# Patient Record
Sex: MALE | Race: WHITE | Employment: FULL TIME | URBAN - NONMETROPOLITAN AREA
[De-identification: names, ages, dates, MRNs, and addresses within clinical notes are randomized per-mention and may not be internally consistent; named-entity substitution may affect disease eponyms.]

---

## 2017-01-05 ENCOUNTER — OFFICE VISIT (OUTPATIENT)
Dept: FAMILY MEDICINE CLINIC | Age: 47
End: 2017-01-05

## 2017-01-05 VITALS
BODY MASS INDEX: 32.34 KG/M2 | DIASTOLIC BLOOD PRESSURE: 82 MMHG | SYSTOLIC BLOOD PRESSURE: 130 MMHG | TEMPERATURE: 97.8 F | HEIGHT: 74 IN | WEIGHT: 252 LBS

## 2017-01-05 DIAGNOSIS — J01.00 SUBACUTE MAXILLARY SINUSITIS: Primary | ICD-10-CM

## 2017-01-05 PROCEDURE — 99212 OFFICE O/P EST SF 10 MIN: CPT | Performed by: FAMILY MEDICINE

## 2017-01-05 RX ORDER — DOXYCYCLINE HYCLATE 100 MG/1
100 CAPSULE ORAL 2 TIMES DAILY
Qty: 20 CAPSULE | Refills: 0 | Status: SHIPPED | OUTPATIENT
Start: 2017-01-05 | End: 2017-01-15

## 2017-07-20 ENCOUNTER — OFFICE VISIT (OUTPATIENT)
Dept: FAMILY MEDICINE CLINIC | Age: 47
End: 2017-07-20
Payer: COMMERCIAL

## 2017-07-20 VITALS
DIASTOLIC BLOOD PRESSURE: 78 MMHG | BODY MASS INDEX: 31.83 KG/M2 | WEIGHT: 248 LBS | TEMPERATURE: 97.7 F | HEART RATE: 64 BPM | SYSTOLIC BLOOD PRESSURE: 120 MMHG | HEIGHT: 74 IN

## 2017-07-20 DIAGNOSIS — E66.9 OBESITY (BMI 30.0-34.9): ICD-10-CM

## 2017-07-20 DIAGNOSIS — K52.9 ACUTE GASTROENTERITIS: Primary | ICD-10-CM

## 2017-07-20 PROBLEM — E66.811 OBESITY (BMI 30.0-34.9): Status: ACTIVE | Noted: 2017-07-20

## 2017-07-20 PROCEDURE — 99212 OFFICE O/P EST SF 10 MIN: CPT | Performed by: FAMILY MEDICINE

## 2017-07-20 PROCEDURE — 4004F PT TOBACCO SCREEN RCVD TLK: CPT | Performed by: FAMILY MEDICINE

## 2017-07-20 PROCEDURE — G8427 DOCREV CUR MEDS BY ELIG CLIN: HCPCS | Performed by: FAMILY MEDICINE

## 2017-07-20 PROCEDURE — G8417 CALC BMI ABV UP PARAM F/U: HCPCS | Performed by: FAMILY MEDICINE

## 2017-07-20 ASSESSMENT — ENCOUNTER SYMPTOMS
VOMITING: 0
SHORTNESS OF BREATH: 0
DIARRHEA: 0
WHEEZING: 0

## 2019-02-23 ENCOUNTER — HOSPITAL ENCOUNTER (EMERGENCY)
Age: 49
Discharge: HOME | End: 2019-02-23
Payer: COMMERCIAL

## 2019-02-23 VITALS
TEMPERATURE: 99.5 F | HEART RATE: 84 BPM | BODY MASS INDEX: 32.08 KG/M2 | WEIGHT: 250 LBS | DIASTOLIC BLOOD PRESSURE: 79 MMHG | SYSTOLIC BLOOD PRESSURE: 133 MMHG | RESPIRATION RATE: 18 BRPM | OXYGEN SATURATION: 98 % | HEIGHT: 74 IN

## 2019-02-23 DIAGNOSIS — N50.812 TESTICULAR PAIN, LEFT: ICD-10-CM

## 2019-02-23 DIAGNOSIS — N30.01 ACUTE CYSTITIS WITH HEMATURIA: Primary | ICD-10-CM

## 2019-02-23 LAB
BILIRUBIN URINE: NEGATIVE
BLOOD, URINE: ABNORMAL
CHARACTER, URINE: ABNORMAL
COLOR: ABNORMAL
GLUCOSE, URINE: NEGATIVE MG/DL
KETONES, URINE: NEGATIVE
LEUKOCYTES, UA: ABNORMAL
NITRITE, URINE: NEGATIVE
PH UA: 6 (ref 5–9)
PROTEIN UA: 30 MG/DL
REFLEX TO URINE C & S: ABNORMAL
SPECIFIC GRAVITY UA: 1.02 (ref 1–1.03)
UROBILINOGEN, URINE: 1 EU/DL (ref 0–1)

## 2019-02-23 PROCEDURE — 87491 CHLMYD TRACH DNA AMP PROBE: CPT

## 2019-02-23 PROCEDURE — 87184 SC STD DISK METHOD PER PLATE: CPT

## 2019-02-23 PROCEDURE — 87591 N.GONORRHOEAE DNA AMP PROB: CPT

## 2019-02-23 PROCEDURE — 6360000002 HC RX W HCPCS: Performed by: NURSE PRACTITIONER

## 2019-02-23 PROCEDURE — 87077 CULTURE AEROBIC IDENTIFY: CPT

## 2019-02-23 PROCEDURE — 99213 OFFICE O/P EST LOW 20 MIN: CPT | Performed by: NURSE PRACTITIONER

## 2019-02-23 PROCEDURE — 81003 URINALYSIS AUTO W/O SCOPE: CPT

## 2019-02-23 PROCEDURE — 87186 SC STD MICRODIL/AGAR DIL: CPT

## 2019-02-23 PROCEDURE — 6370000000 HC RX 637 (ALT 250 FOR IP): Performed by: NURSE PRACTITIONER

## 2019-02-23 PROCEDURE — 2500000003 HC RX 250 WO HCPCS: Performed by: NURSE PRACTITIONER

## 2019-02-23 PROCEDURE — 99214 OFFICE O/P EST MOD 30 MIN: CPT

## 2019-02-23 PROCEDURE — 96372 THER/PROPH/DIAG INJ SC/IM: CPT

## 2019-02-23 PROCEDURE — 87086 URINE CULTURE/COLONY COUNT: CPT

## 2019-02-23 RX ORDER — AMOXICILLIN 500 MG/1
500 CAPSULE ORAL 3 TIMES DAILY
COMMUNITY
End: 2020-02-10 | Stop reason: ALTCHOICE

## 2019-02-23 RX ORDER — AZITHROMYCIN 250 MG/1
1000 TABLET, FILM COATED ORAL ONCE
Status: COMPLETED | OUTPATIENT
Start: 2019-02-23 | End: 2019-02-23

## 2019-02-23 RX ORDER — DOXYCYCLINE HYCLATE 100 MG
100 TABLET ORAL 2 TIMES DAILY
Qty: 20 TABLET | Refills: 0 | Status: SHIPPED | OUTPATIENT
Start: 2019-02-23 | End: 2019-03-05

## 2019-02-23 RX ADMIN — LIDOCAINE HYDROCHLORIDE 1 G: 10 INJECTION, SOLUTION EPIDURAL; INFILTRATION; INTRACAUDAL; PERINEURAL at 14:56

## 2019-02-23 RX ADMIN — AZITHROMYCIN 1000 MG: 250 TABLET, FILM COATED ORAL at 14:55

## 2019-02-23 ASSESSMENT — ENCOUNTER SYMPTOMS
BACK PAIN: 1
COUGH: 1
SORE THROAT: 1
NAUSEA: 0
VOMITING: 0
ABDOMINAL PAIN: 1
DIARRHEA: 0
SHORTNESS OF BREATH: 0

## 2019-02-23 ASSESSMENT — PAIN DESCRIPTION - FREQUENCY: FREQUENCY: CONTINUOUS

## 2019-02-23 ASSESSMENT — PAIN DESCRIPTION - LOCATION: LOCATION: ABDOMEN

## 2019-02-23 ASSESSMENT — PAIN DESCRIPTION - DESCRIPTORS: DESCRIPTORS: CONSTANT

## 2019-02-23 ASSESSMENT — PAIN DESCRIPTION - ORIENTATION: ORIENTATION: LEFT

## 2019-02-23 ASSESSMENT — PAIN SCALES - GENERAL: PAINLEVEL_OUTOF10: 5

## 2019-02-23 ASSESSMENT — PAIN DESCRIPTION - PAIN TYPE: TYPE: ACUTE PAIN

## 2019-02-24 LAB
CHLAMYDIA TRACHOMATIS BY RT-PCR: NOT DETECTED
CT/NG SOURCE: NORMAL
NEISSERIA GONORRHOEAE BY RT-PCR: NOT DETECTED

## 2019-02-26 LAB
ORGANISM: ABNORMAL
URINE CULTURE REFLEX: ABNORMAL
URINE CULTURE REFLEX: ABNORMAL

## 2019-03-06 ENCOUNTER — OFFICE VISIT (OUTPATIENT)
Dept: FAMILY MEDICINE CLINIC | Age: 49
End: 2019-03-06
Payer: COMMERCIAL

## 2019-03-06 VITALS
SYSTOLIC BLOOD PRESSURE: 136 MMHG | BODY MASS INDEX: 31.83 KG/M2 | WEIGHT: 256 LBS | HEART RATE: 72 BPM | HEIGHT: 75 IN | DIASTOLIC BLOOD PRESSURE: 82 MMHG

## 2019-03-06 DIAGNOSIS — N41.0 PROSTATITIS, ACUTE: Primary | ICD-10-CM

## 2019-03-06 PROCEDURE — G8417 CALC BMI ABV UP PARAM F/U: HCPCS | Performed by: FAMILY MEDICINE

## 2019-03-06 PROCEDURE — G8484 FLU IMMUNIZE NO ADMIN: HCPCS | Performed by: FAMILY MEDICINE

## 2019-03-06 PROCEDURE — 99213 OFFICE O/P EST LOW 20 MIN: CPT | Performed by: FAMILY MEDICINE

## 2019-03-06 PROCEDURE — 3017F COLORECTAL CA SCREEN DOC REV: CPT | Performed by: FAMILY MEDICINE

## 2019-03-06 PROCEDURE — G8427 DOCREV CUR MEDS BY ELIG CLIN: HCPCS | Performed by: FAMILY MEDICINE

## 2019-03-06 PROCEDURE — 4004F PT TOBACCO SCREEN RCVD TLK: CPT | Performed by: FAMILY MEDICINE

## 2019-03-06 RX ORDER — CIPROFLOXACIN 500 MG/1
500 TABLET, FILM COATED ORAL 2 TIMES DAILY
Qty: 28 TABLET | Refills: 0 | Status: SHIPPED | OUTPATIENT
Start: 2019-03-06 | End: 2019-03-25 | Stop reason: SDUPTHER

## 2019-03-06 ASSESSMENT — PATIENT HEALTH QUESTIONNAIRE - PHQ9
SUM OF ALL RESPONSES TO PHQ QUESTIONS 1-9: 0
1. LITTLE INTEREST OR PLEASURE IN DOING THINGS: 0
SUM OF ALL RESPONSES TO PHQ QUESTIONS 1-9: 0
SUM OF ALL RESPONSES TO PHQ9 QUESTIONS 1 & 2: 0
2. FEELING DOWN, DEPRESSED OR HOPELESS: 0

## 2019-03-25 ENCOUNTER — TELEPHONE (OUTPATIENT)
Dept: FAMILY MEDICINE CLINIC | Age: 49
End: 2019-03-25

## 2019-03-25 RX ORDER — CIPROFLOXACIN 500 MG/1
500 TABLET, FILM COATED ORAL 2 TIMES DAILY
Qty: 28 TABLET | Refills: 0 | Status: SHIPPED | OUTPATIENT
Start: 2019-03-25 | End: 2020-05-04 | Stop reason: SDUPTHER

## 2020-02-10 ENCOUNTER — TELEPHONE (OUTPATIENT)
Dept: FAMILY MEDICINE CLINIC | Age: 50
End: 2020-02-10

## 2020-02-10 ENCOUNTER — OFFICE VISIT (OUTPATIENT)
Dept: FAMILY MEDICINE CLINIC | Age: 50
End: 2020-02-10
Payer: COMMERCIAL

## 2020-02-10 VITALS
DIASTOLIC BLOOD PRESSURE: 78 MMHG | WEIGHT: 252 LBS | TEMPERATURE: 97.9 F | HEART RATE: 70 BPM | BODY MASS INDEX: 31.33 KG/M2 | OXYGEN SATURATION: 98 % | SYSTOLIC BLOOD PRESSURE: 126 MMHG | HEIGHT: 75 IN

## 2020-02-10 PROCEDURE — 99213 OFFICE O/P EST LOW 20 MIN: CPT | Performed by: FAMILY MEDICINE

## 2020-02-10 RX ORDER — PREDNISONE 20 MG/1
20 TABLET ORAL 2 TIMES DAILY
Qty: 10 TABLET | Refills: 0 | Status: SHIPPED | OUTPATIENT
Start: 2020-02-10 | End: 2020-02-15

## 2020-02-10 RX ORDER — DOXYCYCLINE HYCLATE 100 MG
100 TABLET ORAL 2 TIMES DAILY
Qty: 20 TABLET | Refills: 0 | Status: SHIPPED | OUTPATIENT
Start: 2020-02-10 | End: 2020-05-04

## 2020-02-10 ASSESSMENT — ENCOUNTER SYMPTOMS
SHORTNESS OF BREATH: 0
SORE THROAT: 0
RHINORRHEA: 1
COUGH: 1
WHEEZING: 1

## 2020-02-10 ASSESSMENT — PATIENT HEALTH QUESTIONNAIRE - PHQ9
SUM OF ALL RESPONSES TO PHQ9 QUESTIONS 1 & 2: 0
SUM OF ALL RESPONSES TO PHQ QUESTIONS 1-9: 0
SUM OF ALL RESPONSES TO PHQ QUESTIONS 1-9: 0
1. LITTLE INTEREST OR PLEASURE IN DOING THINGS: 0
2. FEELING DOWN, DEPRESSED OR HOPELESS: 0

## 2020-02-10 NOTE — PROGRESS NOTES
[facility]  [facility] MEDICINE  [address] [facility]  Dept: [phone]  Dept Fax: [phone]  Loc: [phone]  PROGRESS NOTE      Visit Date: 2/10/2020    Shirley Mendez is a 45 y.o. male who presents today for:  Chief Complaint   Patient presents with    Chest Congestion     all started 4 days ago       Cough    Congestion    Fever    Nasal Congestion    Head Congestion       Subjective:  HPI     Chest congestion, cough, head congestion. Started 3 days ago. Had a fever a few days ago. He took mucinex. He missed work this past weekend. Still smoking. He did have body aches for a few days which are better. Fevers are better. He is eating and drinking. 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 HENT: Positive for rhinorrhea. Negative for ear pain and sore throat. Respiratory: Positive for cough and wheezing. Negative for shortness of breath. Neurological: Positive for dizziness. Patient Active Problem List   Diagnosis    Obesity (BMI 30.0-34.9)     No past medical history on file. Past Surgical History:   Procedure Laterality Date    APPENDECTOMY      UPPER GASTROINTESTINAL ENDOSCOPY       No family history on file. Social History     Tobacco Use    Smoking status: Current Every Day Smoker     Packs/day: 2.00     Years: 30.00     Pack years: 60.00     Types: Cigarettes    Smokeless tobacco: Never Used   Substance Use Topics    Alcohol use: No      No current outpatient medications on file. No current facility-administered medications for this visit.       No Known Allergies  Health Maintenance   Topic Date Due    Pneumococcal 0-64 years Vaccine (1 of 1 - PPSV23) 01/10/1975    DTaP/Tdap/Td vaccine (1 - Tdap) 01/10/1980    HIV screen  01/10/1984    Lipid screen  01/10/2009    Diabetes screen  01/10/2009    Shingles Vaccine (1 of 2) 01/10/2019    Colon cancer screen colonoscopy  01/10/2019    Flu vaccine (1) 09/01/2019    Hepatitis A vaccine  Aged Out    Hepatitis B vaccine  Aged Out    Hib vaccine  Aged Out    Meningococcal (ACWY) vaccine  Aged Out       Objective:  /78 (Site: Left Upper Arm, Position: Sitting, Cuff Size: Large Adult)   Pulse 70   Temp 97.9 °F (36.6 °C)   Ht 6' 3\" (1.905 m)   Wt 252 lb (114.3 kg)   SpO2 98%   BMI 31.50 kg/m²   Physical Exam  Vitals signs reviewed. HENT:      Right Ear: Tympanic membrane and ear canal normal.      Left Ear: Tympanic membrane and ear canal normal.      Nose: Mucosal edema and rhinorrhea present. Mouth/Throat:      Mouth: Mucous membranes are moist.      Pharynx: No oropharyngeal exudate or posterior oropharyngeal erythema. Cardiovascular:      Rate and Rhythm: Normal rate and regular rhythm. Heart sounds: No murmur. Pulmonary:      Effort: Pulmonary effort is normal. No respiratory distress. Breath sounds: Normal breath sounds. No wheezing or rhonchi. Neurological:      Mental Status: He is alert and oriented to person, place, and time. Mental status is at baseline. Psychiatric:         Mood and Affect: Mood normal.         Behavior: Behavior normal.         Impression/Plan:  1. Acute bronchitis due to other specified organisms  New problem. Discussed that I think he had influenza-like symptoms and given he is outside the treatment window we will not test.  Given his long history of smoking he likely has undiagnosed COPD so we will treat him for an exacerbation with prednisone and doxycycline.  -continue over-the-counter meds  - predniSONE (DELTASONE) 20 MG tablet; Take 1 tablet by mouth 2 times daily for 5 days  Dispense: 10 tablet; Refill: 0  - doxycycline hyclate (VIBRA-TABS) 100 MG tablet; Take 1 tablet by mouth 2 times daily  Dispense: 20 tablet; Refill: 0    2. Smoker  Discussed cessation    No work 2/7 through today.   He may return to work this Friday as he

## 2020-05-04 ENCOUNTER — TELEPHONE (OUTPATIENT)
Dept: FAMILY MEDICINE CLINIC | Age: 50
End: 2020-05-04

## 2020-05-04 ENCOUNTER — TELEMEDICINE (OUTPATIENT)
Dept: FAMILY MEDICINE CLINIC | Age: 50
End: 2020-05-04
Payer: COMMERCIAL

## 2020-05-04 PROCEDURE — 99213 OFFICE O/P EST LOW 20 MIN: CPT | Performed by: FAMILY MEDICINE

## 2020-05-04 RX ORDER — CIPROFLOXACIN 500 MG/1
500 TABLET, FILM COATED ORAL 2 TIMES DAILY
Qty: 28 TABLET | Refills: 0 | Status: SHIPPED | OUTPATIENT
Start: 2020-05-04 | End: 2020-05-18

## 2020-05-04 ASSESSMENT — ENCOUNTER SYMPTOMS
CONSTIPATION: 1
DIARRHEA: 0

## 2020-05-04 NOTE — LETTER
[address] Medicine  [address] [facility]  Phone: [phone]  Fax: [phone]    Dariela Vasquez MD        May 4, 2020     Patient: Tarun Jorgensen   YOB: 1970   Date of Visit: 5/4/2020       To Whom It May Concern: It is my medical opinion that Michael Tomlin should not work from 5/1-5/4.  he may return to work on 5/8. If you have any questions or concerns, please don't hesitate to call.     Sincerely,          Dariela Vasquez MD

## 2020-05-04 NOTE — PROGRESS NOTES
2020    TELEHEALTH EVALUATION -- Audio/Visual (During CPGKY-80 public health emergency)    HPI:    Sheila Victoria (:  1970) has requested an audio/video evaluation for the following concern(s):    Dysuria, frequency,   Having left flank pain. Having \"low grade temps. \"  Started 3 days ago. Symptoms are improving. No urinary dribbling. He missed work starting -. Patient location: His home  Physician location: [facility]    Review of Systems   Constitutional: Positive for chills and fever. Gastrointestinal: Positive for constipation. Negative for diarrhea. Genitourinary: Positive for dysuria, flank pain and frequency. Negative for scrotal swelling. Prior to Visit Medications    Not on File       Social History     Tobacco Use    Smoking status: Current Every Day Smoker     Packs/day: 2.00     Years: 30.00     Pack years: 60.00     Types: Cigarettes    Smokeless tobacco: Never Used   Substance Use Topics    Alcohol use: No    Drug use: No        No Known Allergies, No past medical history on file. PHYSICAL EXAMINATION:  [ INSTRUCTIONS:  \"[x]\" Indicates a positive item  \"[]\" Indicates a negative item  -- DELETE ALL ITEMS NOT EXAMINED]    Constitutional: [x] Appears well-developed and well-nourished [x] No apparent distress      [] Abnormal-   Mental status  [x] Alert and awake  [x] Oriented to person/place/time [x]Able to follow commands      Eyes:  Sclera  [x]  Normal  [] Abnormal -         Discharge [x]  None visible  [] Abnormal -    HENT:   [x] Normocephalic, atraumatic. [] Abnormal   [] Mouth/Throat: Mucous membranes are moist.       Neck: [x] No visualized mass     Pulmonary/Chest: [x] Respiratory effort normal.  [x] No visualized signs of difficulty breathing or respiratory distress        [] Abnormal-        Abd:  No tenderness on patient self-directed palpation.            Skin:        [x] No significant exanthematous lesions or discoloration noted

## 2020-05-04 NOTE — TELEPHONE ENCOUNTER
Patient has a bladder infection. Kidney pain left side and back pain. Symptoms started Thursday. Patient is requesting something called in. Patient is requesting a work slip he called off work starting 05/01/20.   Pharmacy [facility]

## 2020-05-14 ENCOUNTER — TELEPHONE (OUTPATIENT)
Dept: FAMILY MEDICINE CLINIC | Age: 50
End: 2020-05-14

## 2021-01-30 ENCOUNTER — HOSPITAL ENCOUNTER (EMERGENCY)
Age: 51
Discharge: HOME | End: 2021-01-30
Payer: COMMERCIAL

## 2021-01-30 VITALS
SYSTOLIC BLOOD PRESSURE: 149 MMHG | WEIGHT: 260 LBS | RESPIRATION RATE: 18 BRPM | OXYGEN SATURATION: 95 % | BODY MASS INDEX: 33.37 KG/M2 | HEART RATE: 89 BPM | DIASTOLIC BLOOD PRESSURE: 108 MMHG | HEIGHT: 74 IN | TEMPERATURE: 98.2 F

## 2021-01-30 DIAGNOSIS — B34.9 VIRAL ILLNESS: Primary | ICD-10-CM

## 2021-01-30 DIAGNOSIS — Z20.822 EXPOSURE TO COVID-19 VIRUS: ICD-10-CM

## 2021-01-30 LAB
FLU A ANTIGEN: NEGATIVE
FLU B ANTIGEN: NEGATIVE

## 2021-01-30 PROCEDURE — U0003 INFECTIOUS AGENT DETECTION BY NUCLEIC ACID (DNA OR RNA); SEVERE ACUTE RESPIRATORY SYNDROME CORONAVIRUS 2 (SARS-COV-2) (CORONAVIRUS DISEASE [COVID-19]), AMPLIFIED PROBE TECHNIQUE, MAKING USE OF HIGH THROUGHPUT TECHNOLOGIES AS DESCRIBED BY CMS-2020-01-R: HCPCS

## 2021-01-30 PROCEDURE — 99213 OFFICE O/P EST LOW 20 MIN: CPT

## 2021-01-30 PROCEDURE — 87804 INFLUENZA ASSAY W/OPTIC: CPT

## 2021-01-30 PROCEDURE — 99213 OFFICE O/P EST LOW 20 MIN: CPT | Performed by: NURSE PRACTITIONER

## 2021-01-30 ASSESSMENT — ENCOUNTER SYMPTOMS
VOMITING: 0
NAUSEA: 0
COUGH: 1
SORE THROAT: 1
DIARRHEA: 0
SINUS PRESSURE: 0
SHORTNESS OF BREATH: 0

## 2021-01-30 ASSESSMENT — PAIN DESCRIPTION - PROGRESSION: CLINICAL_PROGRESSION: RAPIDLY WORSENING

## 2021-01-30 ASSESSMENT — PAIN DESCRIPTION - DESCRIPTORS: DESCRIPTORS: ACHING

## 2021-01-30 ASSESSMENT — PAIN DESCRIPTION - FREQUENCY: FREQUENCY: CONTINUOUS

## 2021-01-30 ASSESSMENT — PAIN - FUNCTIONAL ASSESSMENT: PAIN_FUNCTIONAL_ASSESSMENT: PREVENTS OR INTERFERES SOME ACTIVE ACTIVITIES AND ADLS

## 2021-01-30 NOTE — ED PROVIDER NOTES
the hospital encounter of 01/30/21   Rapid influenza A/B antigens   Result Value Ref Range    Flu A Antigen Negative NEGATIVE    Flu B Antigen Negative NEGATIVE       IMAGING:    No orders to display         EKG:      URGENT CARE COURSE:     Vitals:    01/30/21 1455   BP: (!) 149/108   Pulse: 89   Resp: 18   Temp: 98.2 °F (36.8 °C)   TempSrc: Temporal   SpO2: 95%   Weight: 260 lb (117.9 kg)   Height: 6' 2\" (1.88 m)       Medications - No data to display         PROCEDURES:  None    FINAL IMPRESSION      1. Viral illness    2. Exposure to COVID-19 virus          DISPOSITION/ PLAN     Patient presents with an acute viral illness after exposure to COVID-19. Influenza A/B was negative. Patient will be tested for COVID-19. Can use OTC meds for symptom management. Can start Zinc 30 mg daily, Vitamin C 1000 mg twice daily and Vitamin D3 5000 IU daily to help boost immune system. Patient will need to self quarantine until test results have returned, are negative and symptom-free. Patient was instructed to practice good hand hygiene as well as social distancing and to wear a mask when around any other family members. Patient will be notified of test results as soon as they are available and if positive will be referred to his PCP or the health department for further instruction. Work/school note provided. Further instructions were outlined verbally and in the patient's discharge instructions. All the patient's questions were answered. The patient/parent agreed with the plan and was discharged from the [facility] in good condition. PATIENT REFERRED TO:  Radha Mendez MD  [address] [facility] / [facility] [address]      DISCHARGE MEDICATIONS:  There are no discharge medications for this patient. There are no discharge medications for this patient. There are no discharge medications for this patient.       Bruce Lopez, APRN - CNP    (Please note that portions of this note were completed with a voice recognition program. Efforts were made to edit the dictations but occasionally words are mis-transcribed.)         Vicente Alexa Case, APRN - CNP  01/30/21 [address] [facility], APRN - CNP  01/30/21 8140

## 2021-01-30 NOTE — ED NOTES
Pt discharge teaching taught via teach back method. Talked with pt about medication and follow up care. No other concerns voiced. Pt ambulated to leave, rr easy and unlabored.      Chelsie Carter RN  01/30/21 6856

## 2021-02-01 ENCOUNTER — CARE COORDINATION (OUTPATIENT)
Dept: CARE COORDINATION | Age: 51
End: 2021-02-01

## 2021-02-01 NOTE — ACP (ADVANCE CARE PLANNING)
Advance Care Planning   Healthcare Decision Maker:      Click here to complete Healthcare Decision Makers including selection of the Healthcare Decision Maker Relationship (ie \"Primary\"). Today we documented Decision Maker(s) consistent with Legal Next of Kin hierarchy.

## 2021-02-01 NOTE — CARE COORDINATION
Patient contacted regarding recent visit for viral symptoms. This Ronda Almazan contacted the patient by telephone to perform post discharge call. Verified name and  with patient as identifiers. Provided introduction to self, and reason for call due to viral symptoms of infection and/or exposure to COVID-19. Call within 2 business days of discharge: Yes       Patient presented to emergency department/flu clinic with complaints of viral symptoms/exposure to COVID. Patient reports symptoms are the same. Due to no new or worsening symptoms the RN CTN/ACZULLY was not notified for escalation. Discussed exposure protocols and quarantine with CDC Guidelines What To Do If You Are Sick    Patient was given an opportunity for questions and concerns. Stay home except to get medical care    Separate yourself from other people and animals in your home    Call ahead before visiting your doctor    Wear a facemask    Cover your coughs and sneezes    Clean your hands often    Avoid sharing personal household items    Clean all high-touch surfaces everyday    Monitor your symptoms  Seek prompt medical attention if your illness is worsening (e.g., difficulty breathing). Before seeking care, call your healthcare provider and tell them that you have, or are being evaluated for, COVID-19. Put on a facemask before you enter the facility. These steps will help the healthcare provider's office to keep other people in the office or waiting room from getting infected or exposed. Ask your healthcare provider to call the local or [facility] health department. Persons who are placed under If you have a medical emergency and need to call 911, notify the dispatch personnel that you have, or are being evaluated for COVID-19. If possible, put on a facemask before emergency medical services arrive.     The patient agrees to contact the [employer] exposure line [phone], local health department [place] Department of Health: ([phone]) and PCP office for questions related to their healthcare. Author provided contact information for future reference. Patient/family/caregiver given information for [facility] and agrees to enroll yes  Patient's preferred e-mail: [email or web address]   Patient's preferred phone number: [phone]  Based on Loop alert triggers, patient will be contacted by nurse care manager for worsening symptoms. Patient was seen in the ER and tested for Covid. Patient is self quarantined and was given the covid hotline number. Patient educated on covid precautions and given agred to sign up for get well loop.     [facility]  [address] [facility]   Medication Assistance  [address] [facility] and [email or web address]    [phone]  [phone]

## 2021-02-03 ENCOUNTER — VIRTUAL VISIT (OUTPATIENT)
Dept: FAMILY MEDICINE CLINIC | Age: 51
End: 2021-02-03
Payer: COMMERCIAL

## 2021-02-03 DIAGNOSIS — R39.9 UTI SYMPTOMS: ICD-10-CM

## 2021-02-03 DIAGNOSIS — J01.90 ACUTE BACTERIAL SINUSITIS: Primary | ICD-10-CM

## 2021-02-03 DIAGNOSIS — B96.89 ACUTE BACTERIAL SINUSITIS: Primary | ICD-10-CM

## 2021-02-03 PROCEDURE — 99213 OFFICE O/P EST LOW 20 MIN: CPT | Performed by: FAMILY MEDICINE

## 2021-02-03 RX ORDER — DOXYCYCLINE HYCLATE 100 MG
100 TABLET ORAL 2 TIMES DAILY
Qty: 20 TABLET | Refills: 0 | Status: SHIPPED | OUTPATIENT
Start: 2021-02-03 | End: 2021-02-13

## 2021-02-03 ASSESSMENT — ENCOUNTER SYMPTOMS
SORE THROAT: 1
SINUS PRESSURE: 1
COUGH: 1
SHORTNESS OF BREATH: 0
WHEEZING: 0

## 2021-02-03 NOTE — PROGRESS NOTES
2/3/2021    TELEHEALTH EVALUATION -- Audio/Visual (During AROTA-10 public health emergency)    HPI:    Alejandro Bautista (:  1970) has requested an audio/video evaluation for the following concern(s):    Fever:  Improving fevers. in urgent care on . covid test was negative. Influenza test was negative. He does smoke. Has chronic cough. No loss of taste or smell. He was exposed to coworker who tested positive for covid. Heavy feeling in groin started yesterday. He took 2 cipro yesterday that he had left over. Has dark colored urine with odor. Has difficulty starting urine stream.     Works at [employer] Review of Systems   Constitutional: Positive for fatigue and fever. Negative for chills. HENT: Positive for postnasal drip, sinus pressure and sore throat. Respiratory: Positive for cough. Negative for shortness of breath and wheezing. Genitourinary: Positive for difficulty urinating. Negative for dysuria, frequency and urgency. Prior to Visit Medications    Not on File       Social History     Tobacco Use    Smoking status: Current Every Day Smoker     Packs/day: 2.00     Years: 36.00     Pack years: 72.00     Types: Cigarettes    Smokeless tobacco: Never Used   Substance Use Topics    Alcohol use: No    Drug use: No        No Known Allergies, History reviewed. No pertinent past medical history. PHYSICAL EXAMINATION:  [ INSTRUCTIONS:  \"[x]\" Indicates a positive item  \"[]\" Indicates a negative item  -- DELETE ALL ITEMS NOT EXAMINED]  Constitutional: [x] Appears well-developed and well-nourished [x] No apparent distress      [] Abnormal-   Mental status  [x] Alert and awake  [] Oriented to person/place/time [x]Able to follow commands      Eyes:  EOM    []  Normal  [] Abnormal-  Sclera  [x]  Normal  [] Abnormal -         Discharge [x]  None visible  [] Abnormal -    HENT:   [x] Normocephalic, atraumatic.   [] Abnormal   [x] Mouth/Throat: Mucous membranes are moist. Tiffanie Sheriff is a 45 y.o. male being evaluated by a Virtual Visit (video visit) encounter to address concerns as mentioned above. A caregiver was present when appropriate. Due to this being a TeleHealth encounter (During RYVLK-60 public health emergency), evaluation of the following organ systems was limited: Vitals/Constitutional/EENT/Resp/CV/GI//MS/Neuro/Skin/Heme-Lymph-Imm. Pursuant to the emergency declaration under the [address] [place] [address] and the [employer] Act, this Virtual Visit was conducted with patient's (and/or legal guardian's) consent, to reduce the patient's risk of exposure to COVID-19 and provide necessary medical care. The patient (and/or legal guardian) has also been advised to contact this office for worsening conditions or problems, and seek emergency medical treatment and/or call 911 if deemed necessary. Patient identification was verified at the start of the visit: Yes    Total time spent on this encounter: Not billed by time    Services were provided through a video synchronous discussion virtually to substitute for in-person clinic visit. Patient and provider were located at their individual homes. --Kev Hendricks MD on 2/3/2021 at 8:51 AM    An electronic signature was used to authenticate this note.

## 2021-02-17 LAB
SARS-COV-2: NOT DETECTED
SOURCE: NORMAL

## 2021-08-20 ENCOUNTER — TELEPHONE (OUTPATIENT)
Dept: FAMILY MEDICINE CLINIC | Age: 51
End: 2021-08-20

## 2021-08-20 ENCOUNTER — E-VISIT (OUTPATIENT)
Dept: FAMILY MEDICINE CLINIC | Age: 51
End: 2021-08-20
Payer: COMMERCIAL

## 2021-08-20 DIAGNOSIS — J06.9 VIRAL URI: Primary | ICD-10-CM

## 2021-08-20 PROCEDURE — 98970 NQHP OL DIG ASSMT&MGMT 5-10: CPT | Performed by: NURSE PRACTITIONER

## 2021-08-20 RX ORDER — AMOXICILLIN 875 MG/1
875 TABLET, COATED ORAL 2 TIMES DAILY
Qty: 20 TABLET | Refills: 0 | Status: SHIPPED | OUTPATIENT
Start: 2021-08-20 | End: 2021-08-30

## 2021-08-20 RX ORDER — PREDNISONE 10 MG/1
10 TABLET ORAL 2 TIMES DAILY
Qty: 10 TABLET | Refills: 0 | Status: SHIPPED | OUTPATIENT
Start: 2021-08-20 | End: 2021-08-25

## 2021-08-20 ASSESSMENT — LIFESTYLE VARIABLES
SMOKING_YEARS: 30
SMOKING_STATUS: YES

## 2021-08-27 ENCOUNTER — TELEPHONE (OUTPATIENT)
Dept: FAMILY MEDICINE CLINIC | Age: 51
End: 2021-08-27

## 2021-08-27 NOTE — TELEPHONE ENCOUNTER
Candida Em calls with C/O Sinus congestion ,headache, fatigued , fever last Monday. Waiting on COVID results from [employer] 2-7 days. He missed work today and probably tomorrow  , his work will not excuse unless positive for COVID He was treated for Sinus infection on 08/ 20/21 with steroids and now is on [address]. He just feels like crap. Could he have a work slip. Advised him he should quarantine until results are back , stay hydrated, Tylenol/ Motrin

## 2021-08-31 ENCOUNTER — HOSPITAL ENCOUNTER (OUTPATIENT)
Age: 51
Discharge: HOME | End: 2021-08-31
Payer: COMMERCIAL

## 2021-08-31 ENCOUNTER — E-VISIT (OUTPATIENT)
Dept: FAMILY MEDICINE CLINIC | Age: 51
End: 2021-08-31
Payer: COMMERCIAL

## 2021-08-31 DIAGNOSIS — J01.90 ACUTE BACTERIAL SINUSITIS: Primary | ICD-10-CM

## 2021-08-31 DIAGNOSIS — J01.90 ACUTE BACTERIAL SINUSITIS: ICD-10-CM

## 2021-08-31 DIAGNOSIS — B96.89 ACUTE BACTERIAL SINUSITIS: ICD-10-CM

## 2021-08-31 DIAGNOSIS — B96.89 ACUTE BACTERIAL SINUSITIS: Primary | ICD-10-CM

## 2021-08-31 LAB
INFLUENZA A: NOT DETECTED
INFLUENZA B: NOT DETECTED
SARS-COV-2 RNA, RT PCR: NOT DETECTED

## 2021-08-31 PROCEDURE — 99421 OL DIG E/M SVC 5-10 MIN: CPT | Performed by: FAMILY MEDICINE

## 2021-08-31 PROCEDURE — 87636 SARSCOV2 & INF A&B AMP PRB: CPT

## 2021-08-31 RX ORDER — DOXYCYCLINE HYCLATE 100 MG
100 TABLET ORAL 2 TIMES DAILY
Qty: 20 TABLET | Refills: 0 | Status: SHIPPED | OUTPATIENT
Start: 2021-08-31 | End: 2021-09-10

## 2021-08-31 ASSESSMENT — LIFESTYLE VARIABLES
SMOKING_STATUS: YES
SMOKING_YEARS: 30

## 2021-08-31 NOTE — PROGRESS NOTES
HPI: as per patient provided history  Exam: N/A (electronic visit)  ASSESSMENT/PLAN:  1. Acute bacterial sinusitis  Symptoms for over a week. Change amoxicillin to doxycycline to treat a sinus infection. We will do a repeat Covid test.  He can let us know later this week if he needs a note for work depending on how he is feeling  - doxycycline hyclate (VIBRA-TABS) 100 MG tablet; Take 1 tablet by mouth 2 times daily for 10 days  Dispense: 20 tablet; Refill: 0  - COVID-19; Future      Patient instructed to call the office if worsens, or fails to improve as anticipated. 5-10 minutes were spent on the digital evaluation and management of this patient.

## 2021-09-09 ENCOUNTER — APPOINTMENT (OUTPATIENT)
Dept: GENERAL RADIOLOGY | Age: 51
End: 2021-09-09
Payer: COMMERCIAL

## 2021-09-09 ENCOUNTER — HOSPITAL ENCOUNTER (EMERGENCY)
Age: 51
Discharge: HOME | End: 2021-09-09
Payer: COMMERCIAL

## 2021-09-09 VITALS
TEMPERATURE: 98.6 F | DIASTOLIC BLOOD PRESSURE: 82 MMHG | SYSTOLIC BLOOD PRESSURE: 162 MMHG | OXYGEN SATURATION: 96 % | RESPIRATION RATE: 18 BRPM | HEART RATE: 70 BPM | BODY MASS INDEX: 33.38 KG/M2 | WEIGHT: 260 LBS

## 2021-09-09 DIAGNOSIS — B34.9 VIRAL ILLNESS: Primary | ICD-10-CM

## 2021-09-09 DIAGNOSIS — R06.89 DECREASED LUNG SOUNDS: ICD-10-CM

## 2021-09-09 DIAGNOSIS — J44.1 COPD EXACERBATION (HCC): ICD-10-CM

## 2021-09-09 LAB — SARS-COV-2, NAA: NOT  DETECTED

## 2021-09-09 PROCEDURE — 87635 SARS-COV-2 COVID-19 AMP PRB: CPT

## 2021-09-09 PROCEDURE — 93005 ELECTROCARDIOGRAM TRACING: CPT | Performed by: NURSE PRACTITIONER

## 2021-09-09 PROCEDURE — 71046 X-RAY EXAM CHEST 2 VIEWS: CPT

## 2021-09-09 PROCEDURE — 99214 OFFICE O/P EST MOD 30 MIN: CPT | Performed by: NURSE PRACTITIONER

## 2021-09-09 PROCEDURE — 99214 OFFICE O/P EST MOD 30 MIN: CPT

## 2021-09-09 ASSESSMENT — ENCOUNTER SYMPTOMS
NAUSEA: 0
ABDOMINAL PAIN: 0
CHEST TIGHTNESS: 1
SORE THROAT: 1
SHORTNESS OF BREATH: 1
VOMITING: 0
COUGH: 1
WHEEZING: 0

## 2021-09-09 NOTE — ED TRIAGE NOTES
Patient to room with c/o head congestion and sinus pressure beginning three weeks ago. Increased cough beginning one week ago. C/o shortness of breath and chest congestion upon waking this morning. Denies chest pain. Continues on previously prescribed antibiotics.

## 2021-09-09 NOTE — ED PROVIDER NOTES
[address]  Urgent Care Encounter       CHIEF COMPLAINT       Chief Complaint   Patient presents with    Cough     head congestion       Nurses Notes reviewed and I agree except as noted in the HPI. HISTORY OF PRESENT ILLNESS   Gopi Rand is a 45 y.o. male who presents with complaints of a cough, head congestion, chest tightness, shortness of breath, and feelings of \"wooziness\". He states his symptoms have been persistent for the past 3 weeks. He previously seen his PCP and has completed 2 courses of antibiotics without any relief. He believes his symptoms are slightly better than what they were a few weeks ago. However, he does not like the feelings of \"wooziness\". He denies any dizziness or syncopal episodes. He is unsure if anything makes it worse. He previously has tested negative for COVID-19 twice in the past 1 to 2 weeks. He does admit to being a heavy smoker. The history is provided by the patient and the spouse. REVIEW OF SYSTEMS     Review of Systems   Constitutional: Negative for chills and fever. HENT: Positive for congestion and sore throat. Respiratory: Positive for cough, chest tightness and shortness of breath. Negative for wheezing. Cardiovascular: Negative for chest pain and palpitations. Gastrointestinal: Negative for abdominal pain, nausea and vomiting. Neurological: Positive for light-headedness (wooziness). Negative for weakness and headaches. PAST MEDICAL HISTORY   History reviewed. No pertinent past medical history. SURGICALHISTORY     Patient  has a past surgical history that includes Appendectomy and Upper gastrointestinal endoscopy.     CURRENT MEDICATIONS       Previous Medications    ALBUTEROL SULFATE  (90 BASE) MCG/ACT INHALER    Inhale 2 puffs into the lungs every 6 hours as needed for Wheezing    DOXYCYCLINE HYCLATE (VIBRA-TABS) 100 MG TABLET    Take 1 tablet by mouth 2 times daily for 10 days       ALLERGIES Patient is has No Known Allergies. Patients   Immunization History   Administered Date(s) Administered    COVID-19, Moderna, PF, 100mcg/0.5mL 08/13/2021       FAMILY HISTORY     Patient's family history is not on file. SOCIAL HISTORY     Patient  reports that he has been smoking cigarettes. He has a 72.00 pack-year smoking history. He has never used smokeless tobacco. He reports that he does not drink alcohol and does not use drugs. PHYSICAL EXAM     ED TRIAGE VITALS  BP: (!) 162/82, Temp: 98.6 °F (37 °C), Pulse: 70, Resp: 18, SpO2: 96 %,Estimated body mass index is 33.38 kg/m² as calculated from the following:    Height as of 1/30/21: 6' 2\" (1.88 m). Weight as of this encounter: 260 lb (117.9 kg). ,No LMP for male patient. Physical Exam  Vitals and nursing note reviewed. Constitutional:       General: He is not in acute distress. Appearance: He is ill-appearing. HENT:      Right Ear: Tympanic membrane, ear canal and external ear normal.      Left Ear: Tympanic membrane, ear canal and external ear normal.      Nose: Congestion present. No rhinorrhea. Mouth/Throat:      Mouth: Mucous membranes are moist.      Pharynx: Posterior oropharyngeal erythema present. No oropharyngeal exudate. Eyes:      Pupils: Pupils are equal, round, and reactive to light. Cardiovascular:      Rate and Rhythm: Normal rate and regular rhythm. Heart sounds: Normal heart sounds. Pulmonary:      Effort: Pulmonary effort is normal. No respiratory distress. Breath sounds: Decreased air movement present. Examination of the right-upper field reveals decreased breath sounds. Examination of the left-upper field reveals decreased breath sounds. Examination of the right-middle field reveals decreased breath sounds. Examination of the left-middle field reveals decreased breath sounds. Examination of the right-lower field reveals decreased breath sounds.  Examination of the left-lower field reveals decreased breath sounds. Decreased breath sounds present. No wheezing. Lymphadenopathy:      Cervical: No cervical adenopathy. Skin:     General: Skin is warm and dry. Neurological:      Mental Status: He is alert and oriented to person, place, and time. DIAGNOSTIC RESULTS     Labs:  Results for orders placed or performed during the hospital encounter of 09/09/21   COVID-19, Rapid   Result Value Ref Range    SARS-CoV-2, CHINEDU NOT  DETECTED NOT DETECTED   EKG 12 Lead   Result Value Ref Range    Ventricular Rate 70 BPM    Atrial Rate 70 BPM    P-R Interval 144 ms    QRS Duration 100 ms    Q-T Interval 390 ms    QTc Calculation (Bazett) 421 ms    P Axis 59 degrees    R Axis -39 degrees    T Axis 60 degrees       IMAGING:    XR CHEST (2 VW)   Final Result   1. Normal heart size. Evidence for old, healed granulomatous disease. Minimal atelectatic/fibrotic stranding right midlung laterally. 2. No acute infiltrates or effusions are seen. **This report has been created using voice recognition software. It may contain minor errors which are inherent in voice recognition technology. **      Final report electronically signed by Dr. Radha Godoy on 9/9/2021 2:21 PM          I have independently reviewed the radiology images as well as the radiologist's report and have discussed them with the patient. EKG:  NSR with left axis deviation, no ST changes    URGENT CARE COURSE:     Vitals:    09/09/21 1350   BP: (!) 162/82   Pulse: 70   Resp: 18   Temp: 98.6 °F (37 °C)   TempSrc: Temporal   SpO2: 96%   Weight: 260 lb (117.9 kg)       Medications - No data to display       PROCEDURES:  None    FINAL IMPRESSION      1. Viral illness    2. Decreased lung sounds    3. COPD exacerbation ([address]      DISPOSITION/ PLAN   DISPOSITION  09/09/2021 02:35:52 PM     Negative Covid 19 rapid test, x-ray of the chest showed old healing granulomatous disease with minimal atelectatic and fibrotic stranding.   This may indicate a

## 2021-09-10 LAB
EKG ATRIAL RATE: 70 BPM
EKG P AXIS: 59 DEGREES
EKG P-R INTERVAL: 144 MS
EKG Q-T INTERVAL: 390 MS
EKG QRS DURATION: 100 MS
EKG QTC CALCULATION (BAZETT): 421 MS
EKG R AXIS: -39 DEGREES
EKG T AXIS: 60 DEGREES
EKG VENTRICULAR RATE: 70 BPM

## 2021-10-06 ENCOUNTER — OFFICE VISIT (OUTPATIENT)
Dept: FAMILY MEDICINE CLINIC | Age: 51
End: 2021-10-06
Payer: COMMERCIAL

## 2021-10-06 VITALS
HEIGHT: 74 IN | DIASTOLIC BLOOD PRESSURE: 84 MMHG | HEART RATE: 64 BPM | SYSTOLIC BLOOD PRESSURE: 122 MMHG | BODY MASS INDEX: 31.65 KG/M2 | WEIGHT: 246.6 LBS | TEMPERATURE: 96.7 F

## 2021-10-06 DIAGNOSIS — H53.8 BLURRED VISION: Primary | ICD-10-CM

## 2021-10-06 DIAGNOSIS — Z13.220 SCREENING FOR HYPERLIPIDEMIA: ICD-10-CM

## 2021-10-06 DIAGNOSIS — R55 PRE-SYNCOPE: ICD-10-CM

## 2021-10-06 DIAGNOSIS — F17.200 SMOKING: ICD-10-CM

## 2021-10-06 DIAGNOSIS — R06.02 SOB (SHORTNESS OF BREATH): ICD-10-CM

## 2021-10-06 DIAGNOSIS — Z12.5 PROSTATE CANCER SCREENING: ICD-10-CM

## 2021-10-06 PROCEDURE — 99214 OFFICE O/P EST MOD 30 MIN: CPT | Performed by: FAMILY MEDICINE

## 2021-10-06 RX ORDER — PREDNISONE 20 MG/1
20 TABLET ORAL 2 TIMES DAILY
Qty: 10 TABLET | Refills: 0 | Status: SHIPPED | OUTPATIENT
Start: 2021-10-06 | End: 2021-10-13 | Stop reason: SDUPTHER

## 2021-10-06 ASSESSMENT — PATIENT HEALTH QUESTIONNAIRE - PHQ9
SUM OF ALL RESPONSES TO PHQ9 QUESTIONS 1 & 2: 2
SUM OF ALL RESPONSES TO PHQ QUESTIONS 1-9: 2
1. LITTLE INTEREST OR PLEASURE IN DOING THINGS: 1
2. FEELING DOWN, DEPRESSED OR HOPELESS: 1
SUM OF ALL RESPONSES TO PHQ QUESTIONS 1-9: 2
SUM OF ALL RESPONSES TO PHQ QUESTIONS 1-9: 2

## 2021-10-06 ASSESSMENT — ENCOUNTER SYMPTOMS
SHORTNESS OF BREATH: 1
SORE THROAT: 1
CHEST TIGHTNESS: 1
RHINORRHEA: 0
WHEEZING: 1
COUGH: 1
NAUSEA: 0
VOMITING: 0
BLOOD IN STOOL: 0

## 2021-10-06 NOTE — PROGRESS NOTES
[facility]  [facility]  [address] [facility]  Dept: [phone]  Dept Fax: [phone]  Loc: [phone]  PROGRESS NOTE      Visit Date: 10/6/2021    Kota Peralta is a 45 y.o. male who presents today for:  Chief Complaint   Patient presents with   [place]     wants to quit smoking       Subjective: Wife is present. Past 2 months feels \"foggy\" and \"numb\" near his head. Symptoms worsened since Sunday. Admits vision changes and trouble breathing. Cough is productive with white sputum and is sometimes dry. He feels his lungs feel \"tight\" and sometimes a burning feeling. Feels like his chest is congested. Patients hands are shaking and he thought he had an ear infection or had vertigo. Went to urgent care last month. Started albuterol inhaler but still cannot breath well at night due to SOB. Also taking mucinex. Some improvement with it but got less effective. Tried steroids and amoxicillin did not help symptoms. Takes albuterol more than prescribed (usually 4 hours instead of 6). Has past traumatic brain injuries. Last was 6 years ago and a brain bleed was found. Was still there a month later. 1 reported concussion. Smoking 2 packs per day. Cut down from 3 packs about 2 weeks ago. Had first COVID shot but delayed second one due to him not feeling well. Tested negative for COVID 3 times. Admits to quit smoking. Never tried to quit. Wants to try Chantix. Has tried nicotine patches but did not help. Reasons to quit: save money, feel better, grandkids    Review of Systems   Constitutional: Positive for fatigue. Negative for chills and fever. HENT: Positive for ear pain and sore throat. Negative for ear discharge and rhinorrhea. Respiratory: Positive for cough, chest tightness, shortness of breath and wheezing. Gastrointestinal: Negative for blood in stool, nausea and vomiting.    Neurological: Positive for tremors and light-headedness. Negative for dizziness, seizures and numbness. Patient Active Problem List   Diagnosis    Obesity (BMI 30.0-34.9)     No past medical history on file. Past Surgical History:   Procedure Laterality Date    APPENDECTOMY      UPPER GASTROINTESTINAL ENDOSCOPY       No family history on file. Social History     Tobacco Use    Smoking status: Current Every Day Smoker     Packs/day: 2.00     Years: 36.00     Pack years: 72.00     Types: Cigarettes    Smokeless tobacco: Never Used   Substance Use Topics    Alcohol use: No      Current Outpatient Medications   Medication Sig Dispense Refill    albuterol sulfate  (90 Base) MCG/ACT inhaler Inhale 2 puffs into the lungs every 6 hours as needed for Wheezing 18 g 3     No current facility-administered medications for this visit. No Known Allergies  Health Maintenance   Topic Date Due    Hepatitis C screen  Never done    Pneumococcal 0-64 years Vaccine (1 of 2 - PPSV23) Never done    HIV screen  Never done    DTaP/Tdap/Td vaccine (1 - Tdap) Never done    Lipid screen  Never done    Diabetes screen  Never done    Colon cancer screen colonoscopy  Never done    Shingles Vaccine (1 of 2) Never done    Low dose CT lung screening  Never done    Flu vaccine (1) Never done    COVID-19 Vaccine (2 - Moderna 2-dose series) 09/10/2021    Hepatitis A vaccine  Aged Out    Hepatitis B vaccine  Aged Out    Hib vaccine  Aged Out    Meningococcal (ACWY) vaccine  Aged Out       Objective:  /84 (Site: Right Upper Arm, Position: Sitting, Cuff Size: Large Adult)   Pulse 64   Temp 96.7 °F (35.9 °C) (Temporal)   Ht 6' 2\" (1.88 m)   Wt 246 lb 9.6 oz (111.9 kg)   BMI 31.66 kg/m²   Physical Exam  Constitutional:       General: He is not in acute distress. Appearance: He is not ill-appearing or diaphoretic.    HENT:      Right Ear: Tympanic membrane, ear canal and external ear normal.      Left Ear: Tympanic membrane, ear canal and external ear normal.      Mouth/Throat:      Mouth: Mucous membranes are moist.      Pharynx: Oropharynx is clear. No oropharyngeal exudate or posterior oropharyngeal erythema. Cardiovascular:      Rate and Rhythm: Normal rate and regular rhythm. Heart sounds: No murmur heard. No friction rub. No gallop. Pulmonary:      Effort: No respiratory distress. Breath sounds: No stridor. Abdominal:      General: Abdomen is flat. Palpations: Abdomen is soft. Tenderness: There is no abdominal tenderness. There is no guarding. Musculoskeletal:         General: No swelling. Right lower leg: No edema. Left lower leg: No edema. Impression/Plan:  1. Blurred vision - worsening, takes time to focus on something. States everything is blurry. 2. Smoking - wants to quit. Will discuss Chantix on next F/U.   - Full PFT Study With Bronchodilator; Future    3. Pre-syncope - uncontrolled for 2 months.   - CBC; Future  - Comprehensive Metabolic Panel; Future  - TSH with Reflex; Future    4. SOB (shortness of breath) - uncontrolled. Continue albuterol PRN. - predniSONE (DELTASONE) 20 MG tablet; Take 1 tablet by mouth 2 times daily for 5 days  Dispense: 10 tablet; Refill: 0  - Full PFT Study With Bronchodilator; Future    5. Screening for hyperlipidemia   - Lipid Panel; Future    6. Prostate cancer screening - has never been checked. - PSA Screening; Future      They voiced understanding. All questions answered. They agreed with treatment plan. See patient instructions for any educational materials that may have been given. Discussed use, benefit, and side effects of prescribed medications. Reviewed health maintenance.     (Please note that portions of this note may have been completed with a voice recognition program.  Efforts were made to edit the dictation but occasionally words are mis-transcribed.)    Return in about 2 weeks (around 10/20/2021) for pre-syncope.       Electronically signed by Dean Orr on 10/6/2021 at 1:44 PM

## 2021-10-06 NOTE — PATIENT INSTRUCTIONS
Patient Education        Well Visit, Men 48 to 72: Care Instructions  Overview     Well visits can help you stay healthy. Your doctor has checked your overall health and may have suggested ways to take good care of yourself. Your doctor also may have recommended tests. At home, you can help prevent illness with healthy eating, regular exercise, and other steps. Follow-up care is a key part of your treatment and safety. Be sure to make and go to all appointments, and call your doctor if you are having problems. It's also a good idea to know your test results and keep a list of the medicines you take. How can you care for yourself at home? · Get screening tests that you and your doctor decide on. Screening helps find diseases before any symptoms appear. · Eat healthy foods. Choose fruits, vegetables, whole grains, protein, and low-fat dairy foods. Limit fat, especially saturated fat. Reduce salt in your diet. · Limit alcohol. Have no more than 2 drinks a day or 14 drinks a week. · Get at least 30 minutes of exercise on most days of the week. Walking is a good choice. You also may want to do other activities, such as running, swimming, cycling, or playing tennis or team sports. · Reach and stay at a healthy weight. This will lower your risk for many problems, such as obesity, diabetes, heart disease, and high blood pressure. · Do not smoke. Smoking can make health problems worse. If you need help quitting, talk to your doctor about stop-smoking programs and medicines. These can increase your chances of quitting for good. · Care for your mental health. It is easy to get weighed down by worry and stress. Learn strategies to manage stress, like deep breathing and mindfulness, and stay connected with your family and community. If you find you often feel sad or hopeless, talk with your doctor. Treatment can help.   · Talk to your doctor about whether you have any risk factors for sexually transmitted infections (STIs). You can help prevent STIs if you wait to have sex with a new partner (or partners) until you've each been tested for STIs. It also helps if you use condoms (male or female condoms) and if you limit your sex partners to one person who only has sex with you. Vaccines are available for some STIs. · If it's important to you to prevent pregnancy with your partner, talk with your doctor about birth control options that might be best for you. · If you think you may have a problem with alcohol or drug use, talk to your doctor. This includes prescription medicines (such as amphetamines and opioids) and illegal drugs (such as cocaine and methamphetamine). Your doctor can help you figure out what type of treatment is best for you. · Protect your skin from too much sun. When you're outdoors from 10 a.m. to 4 p.m., stay in the shade or cover up with clothing and a hat with a wide brim. Wear sunglasses that block UV rays. Even when it's cloudy, put broad-spectrum sunscreen (SPF 30 or higher) on any exposed skin. · See a dentist one or two times a year for checkups and to have your teeth cleaned. · Wear a seat belt in the car. When should you call for help? Watch closely for changes in your health, and be sure to contact your doctor if you have any problems or symptoms that concern you. Where can you learn more? Go to [email or web address] org and sign in to your [employer] account. Enter C900 in the [facility] box to learn more about \"Well Visit, Men 48 to 72: Care Instructions. \"     If you do not have an account, please click on the \"Sign Up Now\" link. Current as of: February 11, 2021               Content Version: 13.0  © 4215-7750 Healthwise, Incorporated. Care instructions adapted under license by [facility] ([facility]).  If you have questions about a medical condition or this instruction, always ask your healthcare professional. Maximo Mckeon disclaims any warranty or liability

## 2021-10-06 NOTE — PROGRESS NOTES
[facility]  [address] [facility]  Dept: [phone]  Dept Fax: [phone]  Loc: [phone]  PROGRESS NOTE      Visit Date: 10/6/2021    Mirian Miller is a 45 y.o. male who presents today for:  Chief Complaint   Patient presents with   [place]     wants to quit smoking       Subjective:  HPI     Numerous symptoms including chest congestion. His lungs feel tight and sometimes he has a burning sensation. Past 2 months he has felt foggy and numb near his head. He admits to some vision changes as well. He gets shaking of his hands. He initially thought he had an ear infection or vertigo. He was seen in urgent care last month. He did start albuterol due to shortness of breath that is worse at night. He has been taking Mucinex. He tested negative for Covid on September 9 and August 31st.    He does not think he is going to be able to work    Smoking 2 packs/day. He cut down from 3 packs/day about 2 weeks ago. .  Wants to quit smoking. He wants to try Chantix. He has tried nicotine patches in the past.    Wife is present    Review of Systems   Constitutional: Positive for fatigue. HENT: Positive for ear pain. Respiratory: Positive for cough, shortness of breath and wheezing. Neurological: Positive for tremors and light-headedness. Patient Active Problem List   Diagnosis    Obesity (BMI 30.0-34.9)     No past medical history on file. Past Surgical History:   Procedure Laterality Date    APPENDECTOMY      UPPER GASTROINTESTINAL ENDOSCOPY       No family history on file.   Social History     Tobacco Use    Smoking status: Current Every Day Smoker     Packs/day: 2.00     Years: 36.00     Pack years: 72.00     Types: Cigarettes    Smokeless tobacco: Never Used   Substance Use Topics    Alcohol use: No      Current Outpatient Medications   Medication Sig Dispense Refill    albuterol sulfate  90 Base) MCG/ACT inhaler Inhale 2 puffs into the lungs every 6 hours as needed for Wheezing 18 g 3     No current facility-administered medications for this visit. No Known Allergies  Health Maintenance   Topic Date Due    Hepatitis C screen  Never done    Pneumococcal 0-64 years Vaccine (1 of 2 - PPSV23) Never done    HIV screen  Never done    DTaP/Tdap/Td vaccine (1 - Tdap) Never done    Lipid screen  Never done    Diabetes screen  Never done    Colon cancer screen colonoscopy  Never done    Shingles Vaccine (1 of 2) Never done    Low dose CT lung screening  Never done    Flu vaccine (1) Never done    COVID-19 Vaccine (2 - Moderna 2-dose series) 09/10/2021    Hepatitis A vaccine  Aged Out    Hepatitis B vaccine  Aged Out    Hib vaccine  Aged Out    Meningococcal (ACWY) vaccine  Aged Out       Objective:  /84 (Site: Right Upper Arm, Position: Sitting, Cuff Size: Large Adult)   Pulse 64   Temp 96.7 °F (35.9 °C) (Temporal)   Ht 6' 2\" (1.88 m)   Wt 246 lb 9.6 oz (111.9 kg)   BMI 31.66 kg/m²   Physical Exam  Vitals reviewed. Constitutional:       General: He is not in acute distress. Appearance: He is ill-appearing. He is not toxic-appearing. HENT:      Right Ear: Tympanic membrane and ear canal normal.      Left Ear: Tympanic membrane and ear canal normal.   Eyes:      Extraocular Movements: Extraocular movements intact. Pupils: Pupils are equal, round, and reactive to light. Cardiovascular:      Rate and Rhythm: Normal rate and regular rhythm. Heart sounds: No murmur heard. Pulmonary:      Effort: Pulmonary effort is normal. No respiratory distress. Breath sounds: Normal breath sounds. No wheezing or rhonchi. Musculoskeletal:      Right lower leg: No edema. Left lower leg: No edema. Neurological:      Mental Status: He is alert. Motor: Tremor present. Coordination: Romberg sign negative. Psychiatric:         Mood and Affect: Mood is anxious. Speech: Speech normal.         Behavior: Behavior normal.         Impression/Plan:  1. Blurred vision  New problem. Unclear etiology. Recommend he see optometry    2. Smoking  History of smoking and having shortness of breath on exertion. Check PFTs  - Full PFT Study With Bronchodilator; Future    3. Pre-syncope  New symptoms of presyncope. EKG on September 9, 2021 showed normal sinus rhythm with left axis deviation. Check labs to rule out other causes. - CBC; Future  - Comprehensive Metabolic Panel; Future  - TSH with Reflex; Future    4. SOB (shortness of breath)  New problem. Differential diagnoses include viral illness, pneumonia, lung pathology, cardiac conditions, infection, etc.  Will try prednisone. Continue albuterol. Obtain PFTs to look for COPD. Chest x-ray on 9/9/2021 was negative for acute findings. He declined repeat Covid testing  - predniSONE (DELTASONE) 20 MG tablet; Take 1 tablet by mouth 2 times daily for 5 days  Dispense: 10 tablet; Refill: 0  - Full PFT Study With Bronchodilator; Future    5. Screening for hyperlipidemia  - Lipid Panel; Future    6. Prostate cancer screening  - PSA Screening; Future      They voiced understanding. All questions answered. They agreed with treatment plan. See patient instructions for any educational materials that may have been given. Discussed use, benefit, and side effects of prescribed medications. Reviewed health maintenance. (Please note that portions of this note may have been completed with a voice recognition program.  Efforts were made to edit the dictation but occasionally words are mis-transcribed.)    Return in about 2 weeks (around 10/20/2021) for pre-syncope.       Electronically signed by Zachary Ngo MD on 10/6/2021 at 1:48 PM

## 2021-10-07 ENCOUNTER — HOSPITAL ENCOUNTER (OUTPATIENT)
Age: 51
Discharge: HOME | End: 2021-10-07
Payer: COMMERCIAL

## 2021-10-07 DIAGNOSIS — Z12.5 PROSTATE CANCER SCREENING: ICD-10-CM

## 2021-10-07 DIAGNOSIS — R55 PRE-SYNCOPE: ICD-10-CM

## 2021-10-07 DIAGNOSIS — Z13.220 SCREENING FOR HYPERLIPIDEMIA: ICD-10-CM

## 2021-10-07 PROBLEM — D58.2 ELEVATED HEMOGLOBIN (HCC): Status: ACTIVE | Noted: 2021-10-07

## 2021-10-07 PROBLEM — F17.200 SMOKER: Status: ACTIVE | Noted: 2021-10-07

## 2021-10-07 PROBLEM — E78.49 OTHER HYPERLIPIDEMIA: Status: ACTIVE | Noted: 2021-10-07

## 2021-10-07 LAB
ALBUMIN SERPL-MCNC: 4.5 G/DL (ref 3.5–5.1)
ALP BLD-CCNC: 83 U/L (ref 38–126)
ALT SERPL-CCNC: 31 U/L (ref 11–66)
ANION GAP SERPL CALCULATED.3IONS-SCNC: 12 MEQ/L (ref 8–16)
AST SERPL-CCNC: 22 U/L (ref 5–40)
BILIRUB SERPL-MCNC: 0.6 MG/DL (ref 0.3–1.2)
BUN BLDV-MCNC: 14 MG/DL (ref 7–22)
CALCIUM SERPL-MCNC: 9.4 MG/DL (ref 8.5–10.5)
CHLORIDE BLD-SCNC: 107 MEQ/L (ref 98–111)
CHOLESTEROL, TOTAL: 212 MG/DL (ref 100–199)
CO2: 21 MEQ/L (ref 23–33)
CREAT SERPL-MCNC: 0.9 MG/DL (ref 0.4–1.2)
ERYTHROCYTE [DISTWIDTH] IN BLOOD BY AUTOMATED COUNT: 13 % (ref 11.5–14.5)
ERYTHROCYTE [DISTWIDTH] IN BLOOD BY AUTOMATED COUNT: 45.7 FL (ref 35–45)
GFR SERPL CREATININE-BSD FRML MDRD: 88 ML/MIN/1.73M2
GLUCOSE BLD-MCNC: 102 MG/DL (ref 70–108)
HCT VFR BLD CALC: 55.6 % (ref 42–52)
HDLC SERPL-MCNC: 33 MG/DL
HEMOGLOBIN: 18.4 GM/DL (ref 14–18)
LDL CHOLESTEROL CALCULATED: 146 MG/DL
MCH RBC QN AUTO: 31.8 PG (ref 26–33)
MCHC RBC AUTO-ENTMCNC: 33.1 GM/DL (ref 32.2–35.5)
MCV RBC AUTO: 96 FL (ref 80–94)
PLATELET # BLD: 278 THOU/MM3 (ref 130–400)
PMV BLD AUTO: 10.2 FL (ref 9.4–12.4)
POTASSIUM SERPL-SCNC: 4.5 MEQ/L (ref 3.5–5.2)
PROSTATE SPECIFIC ANTIGEN: 0.93 NG/ML (ref 0–1)
RBC # BLD: 5.79 MILL/MM3 (ref 4.7–6.1)
SODIUM BLD-SCNC: 140 MEQ/L (ref 135–145)
TOTAL PROTEIN: 6.8 G/DL (ref 6.1–8)
TRIGL SERPL-MCNC: 163 MG/DL (ref 0–199)
TSH SERPL DL<=0.05 MIU/L-ACNC: 1.76 UIU/ML (ref 0.4–4.2)
WBC # BLD: 9.9 THOU/MM3 (ref 4.8–10.8)

## 2021-10-07 PROCEDURE — 85027 COMPLETE CBC AUTOMATED: CPT

## 2021-10-07 PROCEDURE — 80061 LIPID PANEL: CPT

## 2021-10-07 PROCEDURE — 84443 ASSAY THYROID STIM HORMONE: CPT

## 2021-10-07 PROCEDURE — G0103 PSA SCREENING: HCPCS

## 2021-10-07 PROCEDURE — 80053 COMPREHEN METABOLIC PANEL: CPT

## 2021-10-07 PROCEDURE — 36415 COLL VENOUS BLD VENIPUNCTURE: CPT

## 2021-10-08 ASSESSMENT — ENCOUNTER SYMPTOMS
COUGH: 1
SHORTNESS OF BREATH: 1
WHEEZING: 1

## 2021-10-13 ENCOUNTER — PATIENT MESSAGE (OUTPATIENT)
Dept: FAMILY MEDICINE CLINIC | Age: 51
End: 2021-10-13

## 2021-10-13 DIAGNOSIS — R06.02 SOB (SHORTNESS OF BREATH): ICD-10-CM

## 2021-10-13 RX ORDER — PREDNISONE 20 MG/1
20 TABLET ORAL 2 TIMES DAILY
Qty: 10 TABLET | Refills: 0 | Status: SHIPPED | OUTPATIENT
Start: 2021-10-13 | End: 2021-12-23 | Stop reason: SDUPTHER

## 2021-10-13 NOTE — TELEPHONE ENCOUNTER
From: Kota Peralta  To: Anny Grande MD  Sent: 10/13/2021 4:14 PM EDT  Subject: Prescription Question    I took my last predniSONE yesterday and can fill some tightness returning this afternoon. Could I have it refilled?

## 2021-10-21 ENCOUNTER — OFFICE VISIT (OUTPATIENT)
Dept: FAMILY MEDICINE CLINIC | Age: 51
End: 2021-10-21
Payer: COMMERCIAL

## 2021-10-21 VITALS
HEIGHT: 74 IN | SYSTOLIC BLOOD PRESSURE: 132 MMHG | BODY MASS INDEX: 31.6 KG/M2 | WEIGHT: 246.2 LBS | OXYGEN SATURATION: 97 % | RESPIRATION RATE: 18 BRPM | TEMPERATURE: 97.4 F | DIASTOLIC BLOOD PRESSURE: 82 MMHG | HEART RATE: 82 BPM

## 2021-10-21 DIAGNOSIS — F41.9 ANXIETY: ICD-10-CM

## 2021-10-21 DIAGNOSIS — F17.200 SMOKING: ICD-10-CM

## 2021-10-21 DIAGNOSIS — N50.89 TESTICLE SWELLING: ICD-10-CM

## 2021-10-21 DIAGNOSIS — R06.02 SOB (SHORTNESS OF BREATH): Primary | ICD-10-CM

## 2021-10-21 PROCEDURE — 99214 OFFICE O/P EST MOD 30 MIN: CPT | Performed by: FAMILY MEDICINE

## 2021-10-21 RX ORDER — BUSPIRONE HYDROCHLORIDE 5 MG/1
5 TABLET ORAL 3 TIMES DAILY PRN
Qty: 90 TABLET | Refills: 0 | Status: SHIPPED | OUTPATIENT
Start: 2021-10-21 | End: 2021-11-11

## 2021-10-21 RX ORDER — NICOTINE 21 MG/24HR
1 PATCH, TRANSDERMAL 24 HOURS TRANSDERMAL DAILY
Qty: 42 PATCH | Refills: 0 | Status: SHIPPED | OUTPATIENT
Start: 2021-10-21 | End: 2022-04-26

## 2021-10-21 SDOH — ECONOMIC STABILITY: FOOD INSECURITY: WITHIN THE PAST 12 MONTHS, YOU WORRIED THAT YOUR FOOD WOULD RUN OUT BEFORE YOU GOT MONEY TO BUY MORE.: NEVER TRUE

## 2021-10-21 SDOH — ECONOMIC STABILITY: FOOD INSECURITY: WITHIN THE PAST 12 MONTHS, THE FOOD YOU BOUGHT JUST DIDN'T LAST AND YOU DIDN'T HAVE MONEY TO GET MORE.: NEVER TRUE

## 2021-10-21 ASSESSMENT — ENCOUNTER SYMPTOMS
COUGH: 1
SHORTNESS OF BREATH: 1
WHEEZING: 0

## 2021-10-21 ASSESSMENT — SOCIAL DETERMINANTS OF HEALTH (SDOH): HOW HARD IS IT FOR YOU TO PAY FOR THE VERY BASICS LIKE FOOD, HOUSING, MEDICAL CARE, AND HEATING?: SOMEWHAT HARD

## 2021-10-21 NOTE — PROGRESS NOTES
[facility]  [address] [facility]  Dept: [phone]  Dept Fax: [phone]  Loc: [phone]  PROGRESS NOTE      Visit Date: 10/21/2021    Waleska Ayoub is a 45 y.o. male who presents today for:  Chief Complaint   Patient presents with    Follow-up     2 week follow up; SOB, vision issues, shakiness; Shakiness and breathing have improved, but vision still \"wonky\"       Subjective:  HPI     2 week f/u    SOB has improved some. He feels like he is getting a full breath. On albuterol. Has been on multiple courses of prednisone with the last dose 2 days ago. Uses albuterol every 4-8 hours. PFTs scheduled for nov 3rd. He worked 3 days last week which is full schedule. Still smokes about 1 ppd. Wants to quit. Plans to restart mucinex as he has been off it for the past few days. Vision still feels off. Unable to describe. Plans to see optometry. Has shakiness. New problem:  Blood in semen:  Had left testicle swelling and pain. On cipro. Improving symptoms. Recurrent problem. Anxious. Wakes and trouble falling asleep. Anxious about bills and not being able to work. Wife is present    Review of Systems   Constitutional: Negative for chills and fever. Respiratory: Positive for cough and shortness of breath. Negative for wheezing. Genitourinary: Positive for scrotal swelling and testicular pain. Negative for urgency. Neurological: Negative for dizziness, syncope and light-headedness. Psychiatric/Behavioral: The patient is nervous/anxious. Patient Active Problem List   Diagnosis    Obesity (BMI 30.0-34. 9)    Other hyperlipidemia    Elevated hemoglobin ([address]    Smoker     History reviewed. No pertinent past medical history. Past Surgical History:   Procedure Laterality Date    APPENDECTOMY      UPPER GASTROINTESTINAL ENDOSCOPY       History reviewed. No pertinent family history.   Social History     Tobacco Use    Smoking status: Current Every Day Smoker     Packs/day: 2.00     Years: 36.00     Pack years: 72.00     Types: Cigarettes    Smokeless tobacco: Never Used   Substance Use Topics    Alcohol use: No      Current Outpatient Medications   Medication Sig Dispense Refill    ciprofloxacin (CIPRO) 500 MG tablet Take 1 tablet by mouth 2 times daily for 7 days 14 tablet 0    albuterol (PROVENTIL) (2.5 MG/3ML) 0.083% nebulizer solution Take 3 mLs by nebulization every 6 hours as needed for Wheezing 120 each 0    albuterol sulfate  (90 Base) MCG/ACT inhaler Inhale 2 puffs into the lungs every 6 hours as needed for Wheezing 18 g 3     No current facility-administered medications for this visit. No Known Allergies  Health Maintenance   Topic Date Due    Hepatitis C screen  Never done    Pneumococcal 0-64 years Vaccine (1 of 2 - PPSV23) Never done    HIV screen  Never done    DTaP/Tdap/Td vaccine (1 - Tdap) Never done    Diabetes screen  Never done    Colon cancer screen colonoscopy  Never done    Shingles Vaccine (1 of 2) Never done    Low dose CT lung screening  Never done    Flu vaccine (1) Never done    COVID-19 Vaccine (2 - Moderna 2-dose series) 09/10/2021    Lipid screen  10/07/2026    Hepatitis A vaccine  Aged Out    Hepatitis B vaccine  Aged Out    Hib vaccine  Aged Out    Meningococcal (ACWY) vaccine  Aged Out       Objective:  /82 (Site: Left Upper Arm, Position: Sitting, Cuff Size: Medium Adult)   Pulse 82   Temp 97.4 °F (36.3 °C) (Temporal)   Resp 18   Ht 6' 2\" (1.88 m)   Wt 246 lb 3.2 oz (111.7 kg)   SpO2 97%   BMI 31.61 kg/m²   Physical Exam  Vitals reviewed. Constitutional:       General: He is not in acute distress. Appearance: He is not ill-appearing. Cardiovascular:      Rate and Rhythm: Normal rate and regular rhythm. Pulmonary:      Effort: Pulmonary effort is normal. No respiratory distress.       Breath sounds: Normal breath sounds. No wheezing or rhonchi. Neurological:      Mental Status: He is alert. Psychiatric:         Attention and Perception: Attention normal.         Mood and Affect: Mood is anxious. Speech: Speech normal.         Behavior: Behavior normal.         Thought Content: Thought content normal.         He declines  exam    Impression/Plan:  1. SOB (shortness of breath)  Acute on likely chronic. Smoking history. Possibly COPD. PFTs are scheduled for 1.5 weeks so we will see what that shows. Continue albuterol as needed. 2. Testicle swelling  Recurrent problem every few years. Improving with Cipro. 3. Anxiety  Acute problem. Worse lately. Try BuSpar  - busPIRone (BUSPAR) 5 MG tablet; Take 1 tablet by mouth 3 times daily as needed (anxiety)  Dispense: 90 tablet; Refill: 0    4. Smoking  Chronic. He is ready to quit smoking. Start nicotine patches  - nicotine (NICODERM CQ) 14 MG/24HR; Place 1 patch onto the skin daily  Dispense: 42 patch; Refill: 0      They voiced understanding. All questions answered. They agreed with treatment plan. See patient instructions for any educational materials that may have been given. Discussed use, benefit, and side effects of prescribed medications. Reviewed health maintenance. (Please note that portions of this note may have been completed with a voice recognition program.  Efforts were made to edit the dictation but occasionally words are mis-transcribed.)    Return in about 3 weeks (around 11/11/2021) for SOB; f/u PFTs.       Electronically signed by Hansel Andino MD on 10/21/2021 at 2:58 PM

## 2021-11-03 ENCOUNTER — HOSPITAL ENCOUNTER (OUTPATIENT)
Dept: PULMONOLOGY | Age: 51
Discharge: HOME | End: 2021-11-03
Payer: COMMERCIAL

## 2021-11-03 DIAGNOSIS — R06.02 SOB (SHORTNESS OF BREATH): ICD-10-CM

## 2021-11-03 DIAGNOSIS — F17.200 SMOKING: ICD-10-CM

## 2021-11-03 PROCEDURE — 94729 DIFFUSING CAPACITY: CPT

## 2021-11-03 PROCEDURE — 94060 EVALUATION OF WHEEZING: CPT

## 2021-11-03 PROCEDURE — 94726 PLETHYSMOGRAPHY LUNG VOLUMES: CPT

## 2021-11-03 NOTE — PROGRESS NOTES
Prescreening performed prior to testing. The following symptoms may indicate COVID-19 infection:        One of the following criteria:   Temperature taken, patient temperature was 98.1 F. Fever greater 100.0 F -no  New onset cough -  no  New onset shortness of breath -no  New onset difficulty breathing -no        And/or   Two or more of the following criteria:  New onset muscle aches -no  New onset headache -no  New onset sore throat -no  New onset loss of smell/taste -no  New onset diarrhea -no    Patient's screening was negative. PFT will be performed.

## 2021-11-05 DIAGNOSIS — R06.02 SOB (SHORTNESS OF BREATH): ICD-10-CM

## 2021-11-05 RX ORDER — ALBUTEROL SULFATE 2.5 MG/3ML
2.5 SOLUTION RESPIRATORY (INHALATION) EVERY 6 HOURS PRN
Qty: 120 EACH | Refills: 0 | Status: SHIPPED | OUTPATIENT
Start: 2021-11-05 | End: 2021-12-08 | Stop reason: SDUPTHER

## 2021-11-05 NOTE — TELEPHONE ENCOUNTER
Anna Marie Alegre called requesting a refill on the following medications:  Requested Prescriptions     Pending Prescriptions Disp Refills    albuterol (PROVENTIL) (2.5 MG/3ML) 0.083% nebulizer solution 120 each 0     Sig: Take 3 mLs by nebulization every 6 hours as needed for Wheezing       Date of last visit: 10/21/2021  Date of next visit (if applicable):Visit date not found  Date of last refill: 10/08/21  Pharmacy Name: [facility]      Thanks,  Linette Orozco LPN

## 2021-11-07 PROBLEM — R94.2 ABNORMAL PFTS: Status: ACTIVE | Noted: 2021-11-07

## 2021-11-08 ENCOUNTER — TELEPHONE (OUTPATIENT)
Dept: FAMILY MEDICINE CLINIC | Age: 51
End: 2021-11-08

## 2021-11-08 RX ORDER — TIOTROPIUM BROMIDE 18 UG/1
18 CAPSULE ORAL; RESPIRATORY (INHALATION) DAILY
Qty: 30 CAPSULE | Refills: 2 | Status: SHIPPED | OUTPATIENT
Start: 2021-11-08 | End: 2021-12-30

## 2021-11-08 NOTE — TELEPHONE ENCOUNTER
Recommend discussing with Dr. Lucy Eugene at upcoming visit.   Future Appointments   Date Time Provider Khadar Vides   11/11/2021 10:15 AM Gokul Mosley MD [facility] [address] [facility]

## 2021-11-08 NOTE — TELEPHONE ENCOUNTER
Jodie Em calls seeking more info on [facility] , Advised him a appointment was suggested for 11/04/21 at 21 [phone] , he confirmed he will be there

## 2021-11-08 NOTE — TELEPHONE ENCOUNTER
Polina Reaves calls and the [facility]  Will cost him $ 300 a month , he wants to know if there is anything else that is comparable to [facility] he could use.

## 2021-11-08 NOTE — TELEPHONE ENCOUNTER
Has appt in 3 day with Dr. Torey Ramirez. kev recommended due to PFT results 11/7. Will order, recommend follow up discuss long-term management.

## 2021-11-11 ENCOUNTER — TELEPHONE (OUTPATIENT)
Dept: FAMILY MEDICINE CLINIC | Age: 51
End: 2021-11-11

## 2021-11-11 ENCOUNTER — OFFICE VISIT (OUTPATIENT)
Dept: FAMILY MEDICINE CLINIC | Age: 51
End: 2021-11-11
Payer: COMMERCIAL

## 2021-11-11 VITALS
WEIGHT: 246 LBS | BODY MASS INDEX: 31.57 KG/M2 | TEMPERATURE: 97.3 F | OXYGEN SATURATION: 98 % | DIASTOLIC BLOOD PRESSURE: 82 MMHG | HEART RATE: 66 BPM | RESPIRATION RATE: 18 BRPM | SYSTOLIC BLOOD PRESSURE: 128 MMHG | HEIGHT: 74 IN

## 2021-11-11 DIAGNOSIS — R94.2 ABNORMAL PFTS: ICD-10-CM

## 2021-11-11 DIAGNOSIS — F17.200 SMOKER: ICD-10-CM

## 2021-11-11 DIAGNOSIS — J43.9 PULMONARY EMPHYSEMA, UNSPECIFIED EMPHYSEMA TYPE (HCC): Primary | ICD-10-CM

## 2021-11-11 DIAGNOSIS — J43.8 OTHER EMPHYSEMA (HCC): Primary | ICD-10-CM

## 2021-11-11 PROCEDURE — 99214 OFFICE O/P EST MOD 30 MIN: CPT | Performed by: FAMILY MEDICINE

## 2021-11-11 RX ORDER — BUPROPION HYDROCHLORIDE 150 MG/1
150 TABLET ORAL EVERY MORNING
Qty: 30 TABLET | Refills: 1 | Status: SHIPPED | OUTPATIENT
Start: 2021-11-11 | End: 2021-11-18 | Stop reason: SINTOL

## 2021-11-11 ASSESSMENT — ENCOUNTER SYMPTOMS
COUGH: 1
SHORTNESS OF BREATH: 1

## 2021-11-11 NOTE — TELEPHONE ENCOUNTER
Dr Morris Mail referred Navin Hanson to Pulmonary medicine, pulmonary request that the referring provider order chest xray, good for 30 days from the referral.  Will you order?

## 2021-11-11 NOTE — PROGRESS NOTES
[facility]  [address] [facility]  Dept: [phone]  Dept Fax: [phone]  Loc: [phone]  PROGRESS NOTE      Visit Date: 2021    King Castro is a 45 y.o. male who presents today for:  Chief Complaint   Patient presents with    Shortness of Breath     Follow up - Started Spiriva 11/10/21, productive cough resulting       Subjective:  HPI    COPD:  SOB:  S/p PFTs. Started on spiriva this week. Coughed up mucous this morning. Feels better today than in the last few months. Coughing over past year. Smoking. He down to 1 ppd. He is trying to quit. Has nicotine patches which helps. Has been stressed. Some depression. Did not like buspar    Son  this past weekend    He has been working    Review of Systems   Constitutional: Negative for chills and fever. Respiratory: Positive for cough and shortness of breath. Patient Active Problem List   Diagnosis    Obesity (BMI 30.0-34. 9)    Other hyperlipidemia    Elevated hemoglobin (HCC)    Smoker    Anxiety    Abnormal PFTs     History reviewed. No pertinent past medical history. Past Surgical History:   Procedure Laterality Date    APPENDECTOMY      UPPER GASTROINTESTINAL ENDOSCOPY       History reviewed. No pertinent family history.   Social History     Tobacco Use    Smoking status: Current Every Day Smoker     Packs/day: 1.00     Years: 36.00     Pack years: 36.00     Types: Cigarettes    Smokeless tobacco: Never Used   Substance Use Topics    Alcohol use: No      Current Outpatient Medications   Medication Sig Dispense Refill    tiotropium (SPIRIVA HANDIHALER) 18 MCG inhalation capsule Inhale 1 capsule into the lungs daily 30 capsule 2    albuterol (PROVENTIL) (2.5 MG/3ML) 0.083% nebulizer solution Take 3 mLs by nebulization every 6 hours as needed for Wheezing 120 each 0    nicotine (NICODERM CQ) 14 MG/24HR Place 1 patch onto the skin daily 42 patch 0    albuterol sulfate  (90 Base) MCG/ACT inhaler Inhale 2 puffs into the lungs every 6 hours as needed for Wheezing 18 g 3    busPIRone (BUSPAR) 5 MG tablet Take 1 tablet by mouth 3 times daily as needed (anxiety) (Patient not taking: Reported on 11/11/2021) 90 tablet 0     No current facility-administered medications for this visit. No Known Allergies  Health Maintenance   Topic Date Due    Hepatitis C screen  Never done    Pneumococcal 0-64 years Vaccine (1 of 2 - PPSV23) Never done    HIV screen  Never done    DTaP/Tdap/Td vaccine (1 - Tdap) Never done    Diabetes screen  Never done    Colon cancer screen colonoscopy  Never done    Shingles Vaccine (1 of 2) Never done    Low dose CT lung screening  Never done    Flu vaccine (1) Never done    COVID-19 Vaccine (2 - Moderna 3-dose booster series) 09/10/2021    Lipid screen  10/07/2026    Hepatitis A vaccine  Aged Out    Hepatitis B vaccine  Aged Out    Hib vaccine  Aged Out    Meningococcal (ACWY) vaccine  Aged Out       Objective:  /82 (Site: Left Upper Arm, Position: Sitting, Cuff Size: Medium Adult)   Pulse 66   Temp 97.3 °F (36.3 °C) (Temporal)   Resp 18   Ht 6' 2\" (1.88 m)   Wt 246 lb (111.6 kg)   SpO2 98%   BMI 31.58 kg/m²   Physical Exam  Vitals reviewed. Constitutional:       General: He is not in acute distress. Appearance: He is not ill-appearing. Cardiovascular:      Rate and Rhythm: Normal rate and regular rhythm. Pulmonary:      Effort: Pulmonary effort is normal. No respiratory distress. Breath sounds: Normal breath sounds. No wheezing or rhonchi. Neurological:      Mental Status: He is alert. Psychiatric:         Attention and Perception: Attention normal.         Mood and Affect: Mood is anxious. Speech: Speech normal.         Behavior: Behavior normal.         Thought Content: Thought content normal.         Impression/Plan:  1.  Other emphysema ([address]  Relatively new diagnosis. Chronic condition. Improving control. We will try changing from Spiriva handiinhaler to Respimat. Refer to pulmonology  - tiotropium (SPIRIVA RESPIMAT) 1.25 MCG/ACT AERS inhaler; Inhale 2 puffs into the lungs daily  Dispense: 1 each; Refill: 0  - Maximo Pereira MD, Pulmonary Disease, [facility] [address]    2. Abnormal PFTs  Due to COPD/emphysema. 3. Smoker  recommend cessation. Chronic. Uncontrolled. He is trying to quit. Start Wellbutrin. Continue nicotine patches  - buPROPion (WELLBUTRIN XL) 150 MG extended release tablet; Take 1 tablet by mouth every morning  Dispense: 30 tablet; Refill: 1      Recommend flu, penumo, and covid vaccine    Consider anoro ellipta if spiriva is too expensive. Discussed intermittent fmla. Will consider if he has flare-ups    They voiced understanding. All questions answered. They agreed with treatment plan. See patient instructions for any educational materials that may have been given. Discussed use, benefit, and side effects of prescribed medications. Reviewed health maintenance. (Please note that portions of this note may have been completed with a voice recognition program.  Efforts were made to edit the dictation but occasionally words are mis-transcribed.)    Return in about 6 weeks (around 12/23/2021) for smoking.       Electronically signed by Deyanira Morris MD on 11/11/2021 at 10:22 AM

## 2021-11-11 NOTE — PATIENT INSTRUCTIONS
Patient Education        Learning About COPD  What is COPD? COPD is a lung disease that makes it hard to breathe. COPD stands for chronic obstructive pulmonary disease. It is caused by damage to the lungs over many years, usually from smoking. Other things that may put you at risk for COPD include breathing chemical fumes, dust, or air pollution over a long period of time. Secondhand smoke is also bad. Chronic bronchitis and emphysema are two lung problems that are types of COPD. In chronic bronchitis, the airways that carry air to the lungs (bronchial tubes) get inflamed and make a lot of mucus. This can narrow or block the airways, making it hard for you to breathe. It can also make you cough. In emphysema, the air sacs in your lungs are damaged and lose their stretch. Less air gets in and out of your lungs, which makes you feel short of breath. What happens when you have COPD? COPD gradually gets worse over time. As it gets worse, you may be short of breath even when you do things like get dressed, fix a meal, or eat. People often feel weaker and limit activities. And some people may get lung infections and heart problems. What are the symptoms? The main symptoms are:  · A cough that will not go away. · Mucus that comes up when you cough. · Shortness of breath that gets worse with activity. At times, your symptoms may suddenly flare up and get much worse. This is a called a COPD exacerbation (say \"egg-NADIA--BAY-ann\"). When this happens, your usual symptoms quickly get worse and stay bad. This can be dangerous. You may have to go to the hospital.  How can you keep COPD from getting worse? Not smoking is the best way to keep COPD from getting worse. If you need help quitting, talk to your doctor about stop-smoking programs and medicines. Also, be sure to get your flu, pneumococcal, and whooping cough (pertussis) vaccines. And avoid air pollution, fumes, and dust as much as you can.   How is COPD treated? COPD may be treated with medicines and oxygen, along with self-care. · Medicines called bronchodilators are used to open or relax your airways. They can help you breathe easier. There are two types:  ? Short-acting bronchodilators ease your symptoms. They are considered a good first choice for treating stable COPD in a person whose symptoms come and go (intermittent symptoms). ? Long-acting bronchodilators help prevent breathing problems. They help people whose symptoms do not go away (persistent symptoms). · Oxygen therapy boosts the amount of oxygen in your blood and helps you breathe easier. · Self-care means the things you can do for yourself to help manage your COPD. They are things like:  ? Quitting smoking. ? Eating well.  ? Staying active. ? Avoiding colds, infections, and other things that may trigger your symptoms. ? Staying current on vaccines. A lung (pulmonary) rehab program can help you learn to manage your disease. This program teaches you how to breathe easier, exercise, and eat well. Follow-up care is a key part of your treatment and safety. Be sure to make and go to all appointments, and call your doctor if you are having problems. It's also a good idea to know your test results and keep a list of the medicines you take. Where can you learn more? Go to https://Freepath.Captify. org and sign in to your Saygent account. Enter V314 in the [facility] box to learn more about \"Learning About COPD. \"     If you do not have an account, please click on the \"Sign Up Now\" link. Current as of: July 6, 2021               Content Version: 13.0  © 5345-9261 Healthwise, Incorporated. Care instructions adapted under license by [facility] ([facility]). If you have questions about a medical condition or this instruction, always ask your healthcare professional. [address] any warranty or liability for your use of this information.

## 2021-11-12 NOTE — TELEPHONE ENCOUNTER
Xray order signed. Needs xray done within 30 days of pulm appt. Please advise patient.   Claudene Plater, MD

## 2021-12-07 ENCOUNTER — PATIENT MESSAGE (OUTPATIENT)
Dept: FAMILY MEDICINE CLINIC | Age: 51
End: 2021-12-07

## 2021-12-07 DIAGNOSIS — R06.02 SOB (SHORTNESS OF BREATH): ICD-10-CM

## 2021-12-07 NOTE — TELEPHONE ENCOUNTER
From: Anil Jerry  To: Dr. Leobardo Heard  Sent: 12/7/2021 12:55 PM EST  Subject: Refill    For albuterol for the nebulizer   Thank you

## 2021-12-07 NOTE — TELEPHONE ENCOUNTER
Kota Peralta is requesting a refill on the following medications:  Requested Prescriptions     Pending Prescriptions Disp Refills    albuterol (PROVENTIL) (2.5 MG/3ML) 0.083% nebulizer solution 120 each 0     Sig: Take 3 mLs by nebulization every 6 hours as needed for Wheezing       Date of last visit: 11/11/2021  Date of next visit (if applicable):12/23/2021  Date of last refill: 11/05/2021  Pharmacy Name: [facility] Thanks,  [place]

## 2021-12-08 RX ORDER — ALBUTEROL SULFATE 2.5 MG/3ML
2.5 SOLUTION RESPIRATORY (INHALATION) EVERY 6 HOURS PRN
Qty: 120 EACH | Refills: 0 | Status: SHIPPED | OUTPATIENT
Start: 2021-12-08 | End: 2022-01-05 | Stop reason: SDUPTHER

## 2021-12-20 ENCOUNTER — E-VISIT (OUTPATIENT)
Dept: FAMILY MEDICINE CLINIC | Age: 51
End: 2021-12-20
Payer: COMMERCIAL

## 2021-12-20 DIAGNOSIS — B34.9 VIRAL ILLNESS: Primary | ICD-10-CM

## 2021-12-20 DIAGNOSIS — R06.02 SOB (SHORTNESS OF BREATH): ICD-10-CM

## 2021-12-20 DIAGNOSIS — J43.8 OTHER EMPHYSEMA (HCC): ICD-10-CM

## 2021-12-20 PROCEDURE — 99421 OL DIG E/M SVC 5-10 MIN: CPT | Performed by: FAMILY MEDICINE

## 2021-12-20 ASSESSMENT — LIFESTYLE VARIABLES
SMOKING_YEARS: 35
SMOKING_STATUS: YES

## 2021-12-22 ENCOUNTER — HOSPITAL ENCOUNTER (OUTPATIENT)
Age: 51
Setting detail: SPECIMEN
Discharge: HOME | End: 2021-12-22
Payer: COMMERCIAL

## 2021-12-22 DIAGNOSIS — R09.89 CHEST CONGESTION: ICD-10-CM

## 2021-12-22 PROCEDURE — 87636 SARSCOV2 & INF A&B AMP PRB: CPT

## 2021-12-22 RX ORDER — ALBUTEROL SULFATE 90 UG/1
AEROSOL, METERED RESPIRATORY (INHALATION)
Qty: 8.5 G | Refills: 1 | Status: SHIPPED | OUTPATIENT
Start: 2021-12-22 | End: 2022-02-08 | Stop reason: SDUPTHER

## 2021-12-22 NOTE — TELEPHONE ENCOUNTER
Celina Bryson called requesting a refill on the following medications:  Requested Prescriptions     Pending Prescriptions Disp Refills    albuterol sulfate  (90 Base) MCG/ACT inhaler [Pharmacy Med Name: ALBUTEROL HFA 90 MCG INHALER] 8.5 g      Sig: inhale 2 puffs by mouth every 6 hours if needed for wheezing       Date of last visit: 12/20/2021  Date of next visit (if applicable):1/5/2022  Date of last refill: 09/09/21  Pharmacy Name: [facility]      Thanks,  Jeff Ballard LPN

## 2021-12-23 LAB
INFLUENZA A: NOT DETECTED
INFLUENZA B: NOT DETECTED
SARS-COV-2 RNA, RT PCR: NOT DETECTED

## 2021-12-23 RX ORDER — PREDNISONE 20 MG/1
20 TABLET ORAL 2 TIMES DAILY
Qty: 10 TABLET | Refills: 0 | Status: SHIPPED | OUTPATIENT
Start: 2021-12-23 | End: 2021-12-28

## 2021-12-28 NOTE — PROGRESS NOTES
pulmonary tuberculosis in the past:NO  He ever recently exposed to patients with tuberculosis:NO  He ever recently travelled to endemic places of tuberculosis or out side [place]:NO  His ever tested for PPD in the past: He never had PPD testing in the past    He ever diagnosed with COVID-19 infection in the past:No.    Received 2 doses of COVID-19 vaccine. Wells Score:    Sign/Symptom:                  Score:    Symptoms of  DVT :    0.       (3)                                 Tachycardia:               0. (1.5)  Immobilization:           0. (1.5)  History of VTE:           0. (1)  Malignancy:                0. (1)  Hemoptysis:               0. (1)  No alternative diagnosis: 0  (3)-Moderate COPD. Total score:  0      Sleep medicine HPI:    Usual time to go to bed during the work/regular day of week: 9 PM  Usual time to wake up during the work//regular day of week: 3:45 AM and he is working days. He wakes up at 6 AM on his nonworking days      Sleep apnea symptoms:  Noticed to have loud snoring:Yes. Witnessed apneas during sleep noticed: Yes. History of choking and gasping sensation at night time: No.  History of headaches in the morning:No.  History of dry mouth in the morning: Yes. History of palpitations during night time/nocturnal awakenings: Yes. History of sweating during night time/nocturnal awakenings: No    General:  History of head injury in the past: Yes.     Associated loss of consciousness with head injury: No.  History of seizures: No.   Rest less legs syndrome symptoms:NO  History suggestive of periodic limb movements during sleep: NO  History suggestive of hypnagogic hallucinations: NO  History suggestive of hypnopompic hallucinations: NO  History suggestive of sleep talking:NO  History suggestive of sleep walking:NO  History suggestive of bruxism: No.   History suggestive of cataplexy: NO  History suggestive of sleep paralysis: NO    History regarding old sleep studies:  Prior history of sleep study: Yes. He underwent baseline sleep study more than 20 years back. He was diagnosed with obstructive sleep apnea. He underwent UPPP surgery at that time. Patient never underwent follow-up sleep study to confirm the resolution of sleep apnea  Using CPAP device: No.  Currently using home Oxygen: NO.       Patient considerations:  Is the patient is ambulatory: Yes  Patient is currently using: None of these Wheelchair, Pamela Analisa or [employer]. Para/Quadriplegic: NO  Hearing deficit : NO  Claustrophobic: NO  MDD : NO  Blind: NO  Incontinent: NO  Para/Quadraplegi: NO.   Need transportation to and from Sleep Center:NO    [place] Sleepiness Score:   Sitting and reading:3  Watching TV:3  Sitting inactive in a public place:3  Being a passenger in a motor vehicle for an hour or more:0  Lying down in the afternoon:0-he usually do not take naps  Sitting and talking to someone:0  Sitting quietly after lunch (no alcohol):3  Stopped for a few minutes in traffic while drivin  Total Score: 12     Neck Circumference -   17  Mallampati - 4    He denies any history of Glucoma or urinary retention in the past      Social History:  Occupation:  He is current working: Yes  Type of profession: He is currently working at a [employer] during daytime. Social History     Tobacco Use    Smoking status: Former Smoker     Packs/day: 2.00     Years: 36.00     Pack years: 72.00     Types: Cigarettes     Quit date: 1/10/2022    Smokeless tobacco: Never Used   Substance Use Topics    Alcohol use: No    Drug use: No     Probably he smoked for 36 years with the 2 packs of cigarettes per day.   Quit smoking on 10 January 2022     History of recreational or IV drug use in the past:NO  History of Alcohol use: No.  He quit drinking 15 years back   history of exposure to coal mines/coal dust: NO  History of exposure to foundry dust/welding: NO  History of exposure to quarry/silica/sandblasting: NO  History of exposure to asbestos/working with breaks/ships: NO  History of exposure to farm dust: NO  History of recent travel to long distances: NO  History of exposure to birds, pigeons, or chickens in the past:NO  Pet animals at home:No      History of pulmonary embolism in the past: No            History of DVT in the past:No                             Review of Systems:   General/Constitutional: No recent loss of weight or appetite changes. No fever or chills. HENT: Negative. Eyes: Negative. Upper respiratory tract: No nasal stuffiness or post nasal drip. Lower respiratory tract/ lungs: See HPI  Cardiovascular: No palpitations or chest pain. Gastrointestinal: No nausea or vomiting. Neurological: No focal neurologiacal weakness. Extremities: No edema. Musculoskeletal: No complaints. Genitourinary: No complaints. Hematological: Negative. Psychiatric/Behavioral: Negative. Skin: No itching. Current Medications:        No past medical history on file. Past Surgical History:   Procedure Laterality Date    APPENDECTOMY      UPPER GASTROINTESTINAL ENDOSCOPY         No Known Allergies    Current Outpatient Medications   Medication Sig Dispense Refill    ZINC PO Take by mouth      Dextromethorphan-guaiFENesin (MUCINEX DM PO) Take by mouth      albuterol (PROVENTIL) (2.5 MG/3ML) 0.083% nebulizer solution Take 3 mLs by nebulization every 6 hours as needed for Wheezing 120 each 0    tiotropium (SPIRIVA RESPIMAT) 1.25 MCG/ACT AERS inhaler Inhale 2 puffs into the lungs daily 1 each 0    albuterol sulfate  (90 Base) MCG/ACT inhaler inhale 2 puffs by mouth every 6 hours if needed for wheezing 8.5 g 1    nicotine (NICODERM CQ) 14 MG/24HR Place 1 patch onto the skin daily 42 patch 0     No current facility-administered medications for this visit. No family history on file.         Physical Exam:    VITALS:  /70 (Site: Left Upper Arm, Position: Sitting, Cuff Size: Medium Adult)   Pulse 81 Temp 98.1 °F (36.7 °C)   Ht 6' 2\" (1.88 m)   Wt 252 lb (114.3 kg)   SpO2 97% Comment: room air at rest  BMI 32.35 kg/m²   Nursing note and vitals reviewed. Constitutional: Patient appears moderately built and moderately nourished. No distress. Patient is oriented to person, place, and time. HENT:   Head: Normocephalic and atraumatic. Right Ear: External ear normal.   Left Ear: External ear normal.   Mouth/Throat: Oropharynx is clear and moist.  No oral thrush. Eyes: Conjunctivae are normal. Pupils are equal, round, and reactive to light. No scleral icterus. Neck: Neck supple. No JVD present. No tracheal deviation present. Cardiovascular: Normal rate, regular rhythm, normal heart sounds. No murmur heard. Pulmonary/Chest: Effort normal and breath sounds normal. No stridor. No respiratory distress. Bilateral expiratory wheezes. No rales. Patient exhibits no tenderness. Abdominal: Soft. Patient exhibits no distension. No tenderness. Musculoskeletal: Normal range of motion. Extremities: Patient exhibits no edema and no tenderness. Lymphadenopathy:  No cervical adenopathy. Neurological: Patient is alert and oriented to person, place, and time. Skin: Skin is warm and dry. Patient is not diaphoretic. Psychiatric: Patient  has a normal mood and affect. Patient behavior is normal.     Neck Circumference -   17  Mallampati - 4    Diagnostic Data:    Radiological Data:  Chest Xray:  Thu Sep 9, 2021  2:21 PM   PROCEDURE: XR CHEST (2 VW)   1. Normal heart size. Evidence for old, healed granulomatous disease. Minimal atelectatic/fibrotic stranding right midlung laterally. 2. No acute infiltrates or effusions are seen. Pulmonary function tests: 11/03/2021              Assessment:  -Moderately Severe COPD-not under good control.   He had recent COPD exacerbation due to upper respiratory tract infection in December 2021-he recovered from a COPD exacerbation however still having exertional dyspnea. -Chronic use of tobacco smoking with the 2 packs of cigarettes per day for 36 years. He quit smoking on 10 January 2022. -Hypersomnia ( Excessive daytime sleepiness) may be due to obstructive sleep apnea Vs Inadequate sleep hygiene.  -Snoring with witnessed apneas and hypersomnia-to evaluate for obstructive sleep apnea. -Exertional dyspnea due to COPD VS other etiologies.  -Hx of uvulopalatopharyngoplasty surgery more than 20 years back. He never had a follow-up  Sleep study to check the current status of sleep apnea. Recommendations/Plan:  -Will send serum D- dimer today to evaluate for etiology of exertional dyspnea. He was advised and instructed to call my office in [facility]) to go over the above test results and management. He verbalizes understanding.  -Will do arterial blood gas on his current FiO2 to check for PCO2 level.  -Stop Spiriva Respimat 1.25mcg 2 INH once daily in am.  -Start patient on Stiolto Respimat ( Tiotropium bromide and Olodaterol) 2.5mcg/2.5mcg per actuation 2 puffs once daily. He was detailed about mechanism of action of drug along with associated side effects. He agreed to take the risk and medication. He verbalizes understanding. Patient educated and demonstrated in my office how to use above inhaler. Patient verbalizes understanding.  - Schedule patient for nocturnal polysomnogram (Sleep study) with split night protocol at [facility] sleep lab to diagnose sleep apnea and to get optimal pressure I.e CPAP or BiPAP to start/continue PAP therapy.  Patient to follow with my clinic at [facility] sleep clinic in 6 to 8 weeks with CPAP download for further evaluation.  -I had a discussion with patient regarding avialable treatment options for his sleep disorder breathing including but not limited to CPAP titration in the sleep lab Vs.Dental appliance placement with referral to a local dentist Vs other available surgical options including Uvulopalatopharyngoplasty, maxillomandibular cancer   > 11 months since last LDCT     Risks and benefits of lung cancer screening with LDCT scans discussed:    Significance of positive screen - False-positive LDCT results often occur. 95% of all positive results do not lead to a diagnosis of cancer. Usually further imaging can resolve most false-positive results; however, some patients may require invasive procedures. Over diagnosis risk - 10% to 12% of screen-detected lung cancer cases are over diagnosed--that is, the cancer would not have been detected in the patient's lifetime without the screening. Need for follow up screens annually to continue lung cancer screening effectiveness     Risks associated with radiation from annual LDCT- Radiation exposure is about the same as for a mammogram, which is about 1/3 of the annual background radiation exposure from everyday life. Starting screening at age 54 is not likely to increase cancer risk from radiation exposure. Patients with comorbidities resulting in life expectancy of < 10 years, or that would preclude treatment of an abnormality identified on CT, should not be screened due to lack of benefit.     To obtain maximal benefit from this screening, smoking cessation and long-term abstinence from smoking is critical

## 2021-12-30 DIAGNOSIS — J43.8 OTHER EMPHYSEMA (HCC): ICD-10-CM

## 2021-12-30 RX ORDER — PREDNISONE 20 MG/1
20 TABLET ORAL 2 TIMES DAILY
Qty: 10 TABLET | Refills: 0 | Status: SHIPPED | OUTPATIENT
Start: 2021-12-30 | End: 2022-01-04

## 2021-12-30 RX ORDER — DOXYCYCLINE HYCLATE 100 MG
100 TABLET ORAL 2 TIMES DAILY
Qty: 20 TABLET | Refills: 0 | Status: SHIPPED | OUTPATIENT
Start: 2021-12-30 | End: 2022-01-09

## 2021-12-30 NOTE — TELEPHONE ENCOUNTER
Mayra Gonzalez is requesting a refill on the following medications:  Requested Prescriptions     Pending Prescriptions Disp Refills    tiotropium (SPIRIVA RESPIMAT) 1.25 MCG/ACT AERS inhaler 1 each 0     Sig: Inhale 2 puffs into the lungs daily       Date of last visit: 12/20/2021  Date of next visit (if applicable):1/5/2022  Date of last refill: 11/11/2021  Pharmacy Name: Jay Leija LPN

## 2022-01-05 ENCOUNTER — HOSPITAL ENCOUNTER (OUTPATIENT)
Age: 52
Discharge: HOME | End: 2022-01-05
Payer: COMMERCIAL

## 2022-01-05 ENCOUNTER — HOSPITAL ENCOUNTER (OUTPATIENT)
Dept: GENERAL RADIOLOGY | Age: 52
Discharge: HOME | End: 2022-01-05
Payer: COMMERCIAL

## 2022-01-05 ENCOUNTER — OFFICE VISIT (OUTPATIENT)
Dept: FAMILY MEDICINE CLINIC | Age: 52
End: 2022-01-05
Payer: COMMERCIAL

## 2022-01-05 VITALS
TEMPERATURE: 97.3 F | SYSTOLIC BLOOD PRESSURE: 130 MMHG | BODY MASS INDEX: 31.26 KG/M2 | HEART RATE: 73 BPM | HEIGHT: 74 IN | OXYGEN SATURATION: 99 % | DIASTOLIC BLOOD PRESSURE: 78 MMHG | WEIGHT: 243.6 LBS

## 2022-01-05 DIAGNOSIS — J43.9 PULMONARY EMPHYSEMA, UNSPECIFIED EMPHYSEMA TYPE (HCC): ICD-10-CM

## 2022-01-05 DIAGNOSIS — J43.8 OTHER EMPHYSEMA (HCC): Primary | ICD-10-CM

## 2022-01-05 DIAGNOSIS — Z23 NEED FOR PNEUMOCOCCAL VACCINATION: ICD-10-CM

## 2022-01-05 DIAGNOSIS — F17.200 SMOKING: ICD-10-CM

## 2022-01-05 DIAGNOSIS — R94.2 ABNORMAL PFTS: ICD-10-CM

## 2022-01-05 DIAGNOSIS — R06.02 SOB (SHORTNESS OF BREATH): ICD-10-CM

## 2022-01-05 PROCEDURE — 99213 OFFICE O/P EST LOW 20 MIN: CPT | Performed by: FAMILY MEDICINE

## 2022-01-05 PROCEDURE — 71045 X-RAY EXAM CHEST 1 VIEW: CPT

## 2022-01-05 PROCEDURE — 90471 IMMUNIZATION ADMIN: CPT | Performed by: FAMILY MEDICINE

## 2022-01-05 PROCEDURE — 90732 PPSV23 VACC 2 YRS+ SUBQ/IM: CPT | Performed by: FAMILY MEDICINE

## 2022-01-05 RX ORDER — ALBUTEROL SULFATE 2.5 MG/3ML
2.5 SOLUTION RESPIRATORY (INHALATION) EVERY 6 HOURS PRN
Qty: 120 EACH | Refills: 0 | Status: SHIPPED | OUTPATIENT
Start: 2022-01-05 | End: 2022-02-22 | Stop reason: SDUPTHER

## 2022-01-05 ASSESSMENT — ENCOUNTER SYMPTOMS
WHEEZING: 1
SHORTNESS OF BREATH: 1
COUGH: 1

## 2022-01-05 NOTE — LETTER
[address] Medicine  [address] [facility]  Phone: [phone]  Fax: [phone]    Felipe Hodges MD        January 5, 2022     Patient: Lashonda Jimenez   YOB: 1970   Date of Visit: 1/5/2022       To Whom It May Concern: It is my medical opinion that Sandi Aden may return to work on 1/9/2022. If you have any questions or concerns, please don't hesitate to call.     Sincerely,        Felipe Hodges MD

## 2022-01-05 NOTE — PROGRESS NOTES
[facility]  [address] [facility]  Dept: [phone]  Dept Fax: [phone]  Loc: [phone]  PROGRESS NOTE      Visit Date: 1/5/2022    Neeraj Oviedo is a 45 y.o. male who presents today for:  Chief Complaint   Patient presents with    Follow-up     COPD, Other emphysema    Health Maintenance     would like to know if he is able to get the pneumo vaccine       Subjective:  HPI    COPD:  Improving. Tired. covid test negative on 12/22. Last dose of prednisone was yesterday. Treated with prednisone on 12/23, doxycycline and prednisone on 12/30. Using albuterol every 4-6 hours. Trying to quit smoking. Has pulmonologist appt on 1/11. On spiriva. PFTs done in nov 2021. Chest xray to be done today. SOB with walking    Has been referred to sleep center with appt in feb. Off work since [employer]  Needs FMLA forms completed    Review of Systems   Constitutional: Negative for chills and fever. Respiratory: Positive for cough, shortness of breath and wheezing. Patient Active Problem List   Diagnosis    Obesity (BMI 30.0-34. 9)    Other hyperlipidemia    Elevated hemoglobin (HCC)    Smoker    Anxiety    Abnormal PFTs     History reviewed. No pertinent past medical history. Past Surgical History:   Procedure Laterality Date    APPENDECTOMY      UPPER GASTROINTESTINAL ENDOSCOPY       History reviewed. No pertinent family history.   Social History     Tobacco Use    Smoking status: Current Every Day Smoker     Packs/day: 1.00     Years: 36.00     Pack years: 36.00     Types: Cigarettes    Smokeless tobacco: Never Used   Substance Use Topics    Alcohol use: No      Current Outpatient Medications   Medication Sig Dispense Refill    albuterol (PROVENTIL) (2.5 MG/3ML) 0.083% nebulizer solution Take 3 mLs by nebulization every 6 hours as needed for Wheezing 120 each 0    tiotropium (SPIRIVA RESPIMAT) 1.25 MCG/ACT AERS inhaler Inhale 2 puffs into the lungs daily 1 each 0    doxycycline hyclate (VIBRA-TABS) 100 MG tablet Take 1 tablet by mouth 2 times daily for 10 days 20 tablet 0    albuterol sulfate  (90 Base) MCG/ACT inhaler inhale 2 puffs by mouth every 6 hours if needed for wheezing 8.5 g 1    nicotine (NICODERM CQ) 14 MG/24HR Place 1 patch onto the skin daily 42 patch 0     No current facility-administered medications for this visit. No Known Allergies  Health Maintenance   Topic Date Due    Hepatitis C screen  Never done    Pneumococcal 0-64 years Vaccine (1 of 2 - PPSV23) Never done    HIV screen  Never done    DTaP/Tdap/Td vaccine (1 - Tdap) Never done    Diabetes screen  Never done    Colon cancer screen colonoscopy  Never done    Shingles Vaccine (1 of 2) Never done    Low dose CT lung screening  Never done    COVID-19 Vaccine (3 - Booster for Moderna series) 05/29/2022    Depression Screen  10/06/2022    Lipid screen  10/07/2026    Flu vaccine  Completed    Hepatitis A vaccine  Aged Out    Hepatitis B vaccine  Aged Out    Hib vaccine  Aged Out    Meningococcal (ACWY) vaccine  Aged Out       Objective:  /78 (Site: Left Upper Arm, Position: Sitting)   Pulse 73   Temp 97.3 °F (36.3 °C)   Ht 6' 2\" (1.88 m)   Wt 243 lb 9.6 oz (110.5 kg)   SpO2 99%   BMI 31.28 kg/m²   Physical Exam  Vitals reviewed. Constitutional:       General: He is not in acute distress. Appearance: He is not ill-appearing. Cardiovascular:      Rate and Rhythm: Normal rate and regular rhythm. Pulmonary:      Effort: Pulmonary effort is normal. No respiratory distress. Breath sounds: Normal breath sounds. No wheezing. Neurological:      Mental Status: He is alert. Psychiatric:         Mood and Affect: Mood normal.         Behavior: Behavior normal.         Impression/Plan:  1. Other emphysema (HCC)  Chronic. Recent exacerbation. Continue albuterol and Spiriva.   Has appointment with pulmonology. Course of doxycycline. Just finished steroids    2. Abnormal PFTs      3. Smoking  recommend cessation. Has nicotine patches. 4. Need for pneumococcal vaccination  - PNEUMOVAX 23 subcutaneous/IM (Pneumococcal polysaccharide vaccine 23-valent >= 1yo)    Obtain chest xray for his pulm appt. BRITTANY from dec 20-jan 8th. Return to work on [employer]. They voiced understanding. All questions answered. They agreed with treatment plan. See patient instructions for any educational materials that may have been given. Discussed use, benefit, and side effects of prescribed medications. Reviewed health maintenance. (Please note that portions of this note may have been completed with a voice recognition program.  Efforts were made to edit the dictation but occasionally words are mis-transcribed.)    Return if symptoms worsen or fail to improve.       Electronically signed by Darnell Cerna MD on 1/5/2022 at 12:14 PM

## 2022-01-05 NOTE — PROGRESS NOTES
Immunization(s) given during visit:    Immunizations Administered     Name Date Dose Route    Pneumococcal Polysaccharide ([id number]) 1/5/2022 0.5 mL Intramuscular    Site: Deltoid- Left    Lot: [id number]    NDC: 2113-9029-84          Most recent Vaccine Information Sheet dated 1/5/2022 given to paula

## 2022-01-05 NOTE — TELEPHONE ENCOUNTER
Last visit- 12/20/2021  Next visit- 1/5/2022    Requested Prescriptions     Pending Prescriptions Disp Refills    albuterol (PROVENTIL) (2.5 MG/3ML) 0.083% nebulizer solution 120 each 0     Sig: Take 3 mLs by nebulization every 6 hours as needed for Wheezing        Last fill date- 12/8/21  Pharmacy-   [facility] [address] [place] [address] [address]  [address]  Phone: [phone] Fax: [phone]

## 2022-01-06 ENCOUNTER — TELEPHONE (OUTPATIENT)
Dept: FAMILY MEDICINE CLINIC | Age: 52
End: 2022-01-06

## 2022-01-06 NOTE — LETTER
[address] Medicine  [address]  [facility] [address]  Phone: [phone]  Fax: [phone]     José Manuel Brown MD           January 6, 2022      Patient: Rashi Yousif   YOB: 1970   Date of Visit: 1/5/2022         To Whom It May Concern:     It is my medical opinion that Richard Linton may return to work on 1/9/2022.     Please excuse him from work from 12/20/2021 through January 9, 2022     If you have any questions or concerns, please don't hesitate to call.     Sincerely,           José Manuel Brown MD

## 2022-01-06 NOTE — TELEPHONE ENCOUNTER
Tulio Mcgill calls and he needs a note that states he has been off work from 12-20-21 to 01-09-22 to turn into work today

## 2022-01-11 ENCOUNTER — OFFICE VISIT (OUTPATIENT)
Dept: PULMONOLOGY | Age: 52
End: 2022-01-11
Payer: COMMERCIAL

## 2022-01-11 ENCOUNTER — HOSPITAL ENCOUNTER (OUTPATIENT)
Age: 52
Discharge: HOME | End: 2022-01-11
Payer: COMMERCIAL

## 2022-01-11 VITALS
OXYGEN SATURATION: 97 % | HEART RATE: 81 BPM | DIASTOLIC BLOOD PRESSURE: 70 MMHG | WEIGHT: 252 LBS | HEIGHT: 74 IN | BODY MASS INDEX: 32.34 KG/M2 | SYSTOLIC BLOOD PRESSURE: 118 MMHG | TEMPERATURE: 98.1 F

## 2022-01-11 DIAGNOSIS — R06.81 APNEA: ICD-10-CM

## 2022-01-11 DIAGNOSIS — Z87.891 PERSONAL HISTORY OF TOBACCO USE: ICD-10-CM

## 2022-01-11 DIAGNOSIS — R06.09 DYSPNEA ON EXERTION: ICD-10-CM

## 2022-01-11 DIAGNOSIS — R06.83 SNORING: ICD-10-CM

## 2022-01-11 DIAGNOSIS — J44.9 MODERATE COPD (CHRONIC OBSTRUCTIVE PULMONARY DISEASE) (HCC): ICD-10-CM

## 2022-01-11 DIAGNOSIS — G47.10 HYPERSOMNIA: ICD-10-CM

## 2022-01-11 DIAGNOSIS — R79.89 ELEVATED D-DIMER: Primary | ICD-10-CM

## 2022-01-11 DIAGNOSIS — Z77.22 TOBACCO SMOKE EXPOSURE: ICD-10-CM

## 2022-01-11 DIAGNOSIS — J44.9 MODERATE COPD (CHRONIC OBSTRUCTIVE PULMONARY DISEASE) (HCC): Primary | ICD-10-CM

## 2022-01-11 LAB
BASE EXCESS (CALCULATED): -0.6 MMOL/L (ref -2.5–2.5)
COLLECTED BY:: NORMAL
D-DIMER QUANTITATIVE: 553 NG/ML FEU (ref 0–500)
DEVICE: NORMAL
HCO3: 23 MMOL/L (ref 23–28)
O2 SATURATION: 96 %
PCO2: 35 MMHG (ref 35–45)
PH BLOOD GAS: 7.43 (ref 7.35–7.45)
PO2: 81 MMHG (ref 71–104)

## 2022-01-11 PROCEDURE — 82803 BLOOD GASES ANY COMBINATION: CPT

## 2022-01-11 PROCEDURE — 85379 FIBRIN DEGRADATION QUANT: CPT

## 2022-01-11 PROCEDURE — 36415 COLL VENOUS BLD VENIPUNCTURE: CPT

## 2022-01-11 PROCEDURE — 99205 OFFICE O/P NEW HI 60 MIN: CPT | Performed by: INTERNAL MEDICINE

## 2022-01-11 PROCEDURE — G0296 VISIT TO DETERM LDCT ELIG: HCPCS | Performed by: INTERNAL MEDICINE

## 2022-01-12 ENCOUNTER — TELEPHONE (OUTPATIENT)
Dept: PULMONOLOGY | Age: 52
End: 2022-01-12

## 2022-01-12 ENCOUNTER — HOSPITAL ENCOUNTER (OUTPATIENT)
Dept: CT IMAGING | Age: 52
Discharge: HOME | End: 2022-01-12
Payer: COMMERCIAL

## 2022-01-12 DIAGNOSIS — R06.09 DYSPNEA ON EXERTION: ICD-10-CM

## 2022-01-12 DIAGNOSIS — R79.89 ELEVATED D-DIMER: ICD-10-CM

## 2022-01-12 PROCEDURE — 6360000004 HC RX CONTRAST MEDICATION: Performed by: INTERNAL MEDICINE

## 2022-01-12 PROCEDURE — 71275 CT ANGIOGRAPHY CHEST: CPT

## 2022-01-12 RX ADMIN — IOPAMIDOL 85 ML: 755 INJECTION, SOLUTION INTRAVENOUS at 12:51

## 2022-01-12 NOTE — TELEPHONE ENCOUNTER
[place] [facility] called back to let us know the patient did not have a PE. Patient was sent home and Dr. Meme Kothari was notified.

## 2022-01-12 NOTE — TELEPHONE ENCOUNTER
Patient called back regarding dr KENT called last night. I spoke to [facility]  and Dr. Ovidio Stanton would like pt to have a cta chest Stat. He si scheduled for 1pm today at [facility] and will will be in touch with him regarding the results of the stat cta scan. Patient si aware.

## 2022-01-13 ENCOUNTER — TELEPHONE (OUTPATIENT)
Dept: PULMONOLOGY | Age: 52
End: 2022-01-13

## 2022-01-13 DIAGNOSIS — R93.89 ABNORMAL CT OF THE CHEST: Primary | ICD-10-CM

## 2022-01-13 NOTE — TELEPHONE ENCOUNTER
Patient was calling and asking about his X-ray results. He is wondering about his D-Dimer as well.         Please call him at [phone]

## 2022-01-13 NOTE — TELEPHONE ENCOUNTER
I spoke to patient and informed him that the referral was faxed over the Dr. Ran Muse and they will call him with date and time, also CT is cancelled per Dr. Erick Neville

## 2022-01-13 NOTE — TELEPHONE ENCOUNTER
I called patient at given phone number i.e [employer] and spoke with him over phone. I informed in detail about the CTA chest test results along with elevated D-dimer results. Please schedule patient for [facility] consultation with [facility]/Dr.Scott Lluvia MD as soon as possible for further evaluation of Hypodensities in the right and left lobes of the liver noted on his recent CT chest dated 1/12/22. Please inform patient regarding his date of appt with GI service. Please cancel Low dose CT chest ordered on 1/12/22    I advised him to keep scheduled appointment with my clinic with out fail. He verbalizes understanding.

## 2022-01-18 ENCOUNTER — FOLLOWUP TELEPHONE ENCOUNTER (OUTPATIENT)
Dept: CARDIAC REHAB | Age: 52
End: 2022-01-18

## 2022-01-18 NOTE — TELEPHONE ENCOUNTER
PULMONARY REHABILITATION REFERRAL  COPD Exacerbation    Pulmonary Rehab Evaluation order received. Spoke with patient about the benefits of supervised comfortable, progressive exercise with oxygen saturation monitoring and pulmonary education. Pt states his work schedule very busy right now and get mandated to work extra so probably will not work right now. Pt asked if we could call back in beginning of March and I told him we would do that.

## 2022-01-27 ENCOUNTER — TELEPHONE (OUTPATIENT)
Dept: PULMONOLOGY | Age: 52
End: 2022-01-27

## 2022-01-27 DIAGNOSIS — R93.89 ABNORMAL CT OF THE CHEST: Primary | ICD-10-CM

## 2022-01-27 NOTE — TELEPHONE ENCOUNTER
Patient had been referred by Dr. Sina Willingham to a GI provider. Patient stated that he had been scheduled for appts but had to cancel d/t being mandated for work and now they will not see him. He is wanting to know if he can get a referral to Dr. Stephen Kwon or Dr. Melvin Addison.   Please advise

## 2022-01-28 NOTE — TELEPHONE ENCOUNTER
Please schedule patient for [facility] consultation with Shayla Barkley MD as soon as possible for further evaluation of Hypodensities in the right and left lobes of the liver noted on his recent CT chest dated 1/12/22. The above consult was placed in [facility]. Please schedule it and inform patient.

## 2022-02-02 ENCOUNTER — TELEPHONE (OUTPATIENT)
Dept: SLEEP CENTER | Age: 52
End: 2022-02-02

## 2022-02-02 DIAGNOSIS — R06.81 APNEA: ICD-10-CM

## 2022-02-02 DIAGNOSIS — G47.10 HYPERSOMNIA: ICD-10-CM

## 2022-02-02 DIAGNOSIS — R06.83 SNORING: Primary | ICD-10-CM

## 2022-02-02 NOTE — TELEPHONE ENCOUNTER
Emiliana Rome,    An e-mail was received this morning at 1122 that Kirk's split night study was denied, stating \" [employer] will send a denial letter and the appeal instructions will be outlined. A VM has been left on Kirk's voice mail asking him to watch for a denial letter from his insurance and to assure your office has a copy. We have left Kirk on the sleep schedule until we get confirmation on how to proceed. There was not option for peer to peer presented.   Only denial.  Thank you,   Jorge Bagley, RRT, RPSGT

## 2022-02-02 NOTE — TELEPHONE ENCOUNTER
Home/Portable sleep test was ordred in [address] [place] [address] Rd. Please schedule it and inform patient. Patient to follow with my clinic/Ms. Kate Caldwell CNP/Ms. Nicolas Luong CNP/Mr. Roger Coto CNP  [address] clinic in 1 week after the Home/Portable sleep test for further management.

## 2022-02-03 NOTE — PROGRESS NOTES
[facility]  Pulmonary medicine clinic follow up note. Patient: James Pham  : 1970     Lung Nodule Screening     [x]? Qualifies    []? Does not qualify   []? Declined    []? Completed      Chief complaint/[place]: James Pham is a 47 y.o. old male came for follow-up regarding his moderately severe COPD and shortness of breath after having recommended test including serum D-dimer level. He was found to have elevated serum D-dimer level. Patient subsequently underwent CT angiogram of chest with IV contrast and found to have no pulmonary embolism. His low-dose CT scan of chest without IV contrast was canceled. His split-night sleep study requested last visit was canceled due to denial of his medical insurance. Patient currently waiting to have a home/portable sleep study to evaluate for sleep apnea. Patient had a history of UPPP surgery more than 20 years back. He was initially referred from Dr. Gina Solomon MD.     On today's questioning:  He admits to occasional cough with no expectoration. He denies hemoptysis. He denies fever or chills. He denies recent hospitalizations or emergency room visits. He is using his prescribed inhalers with excellent compliance. He is using his rescue inhaler rarely. He denies recent loss of weight or appetite changes. He denies recent decline in functional status. He was diagnosed with COPD by his family physician in 2021. He is currently on treatment with following inhalers:  Stiolto Respimat ( Tiotropium bromide and Olodaterol) 2.5mcg/2.5mcg per actuation 2 puffs once daily  Albuterol HFA 90mcg/Spray MDI, 2puffs  Q6Hprn. Albuterol 2.5 mg by nebulization every 6 hourly as needed    He is currently not using any oxygen.     He is currently using any oxygen supplementation at rest, exercise or during sleep/at night time: No    He ever diagnosed with connective tissue diseases including Systemic lupus Erythematosus, Rheumatoid arthritis etc:NO    He ever diagnosed with COVID-19 infection in the past:No.    Received 2 doses of COVID-19 vaccine. He denies any history of Glucoma or urinary retention in the past      Social History:  Occupation:  He is current working: Yes  Type of profession: He is currently working at a [employer] during daytime. Social History     Tobacco Use    Smoking status: Former Smoker     Packs/day: 2.00     Years: 36.00     Pack years: 72.00     Types: Cigarettes     Quit date: 1/10/2022     Years since quittin.0    Smokeless tobacco: Never Used   Substance Use Topics    Alcohol use: No    Drug use: No     Probably he smoked for 36 years with the 2 packs of cigarettes per day. Quit smoking on 10 January 2022     History of recreational or IV drug use in the past:NO  History of Alcohol use: No.  He quit drinking 15 years back   history of exposure to coal mines/coal dust: NO  History of exposure to foundry dust/welding: NO  History of exposure to quarry/silica/sandblasting: NO  History of exposure to asbestos/working with breaks/ships: NO  History of exposure to farm dust: NO  History of recent travel to long distances: NO  History of exposure to birds, pigeons, or chickens in the past:NO  Pet animals at home:No      History of pulmonary embolism in the past: No            History of DVT in the past:No                             Review of Systems:   General/Constitutional: he gained 2lbs of weight from the last visit. No fever or chills. HENT: Negative. Eyes: Negative. Upper respiratory tract: No nasal stuffiness or post nasal drip. Lower respiratory tract/ lungs: See HPI  Cardiovascular: No palpitations or chest pain. Gastrointestinal: No nausea or vomiting. Neurological: No focal neurologiacal weakness. Extremities: No edema. Musculoskeletal: No complaints. Genitourinary: No complaints.   Hematological: Negative. Psychiatric/Behavioral: Negative. Skin: No itching. Current Medications:      No past medical history on file. Past Surgical History:   Procedure Laterality Date    APPENDECTOMY      UPPER GASTROINTESTINAL ENDOSCOPY         No Known Allergies    Current Outpatient Medications   Medication Sig Dispense Refill    ZINC PO Take by mouth      Dextromethorphan-guaiFENesin (MUCINEX DM PO) Take by mouth      tiotropium-olodaterol (STIOLTO) 2.5-2.5 MCG/ACT AERS Inhale 2 puffs into the lungs daily 1 each 11    albuterol (PROVENTIL) (2.5 MG/3ML) 0.083% nebulizer solution Take 3 mLs by nebulization every 6 hours as needed for Wheezing 120 each 0    albuterol sulfate  (90 Base) MCG/ACT inhaler inhale 2 puffs by mouth every 6 hours if needed for wheezing 8.5 g 1    nicotine (NICODERM CQ) 14 MG/24HR Place 1 patch onto the skin daily (Patient not taking: Reported on 2/8/2022) 42 patch 0     No current facility-administered medications for this visit. No family history on file. Physical Exam:    VITALS:  /78 (Site: Left Upper Arm, Position: Sitting, Cuff Size: Medium Adult)   Pulse 68   Temp 97.9 °F (36.6 °C)   Ht 6' 3\" (1.905 m)   Wt 254 lb (115.2 kg)   SpO2 99% Comment: room air at rest  BMI 31.75 kg/m²   Nursing note and vitals reviewed. Constitutional: Patient appears moderately built and moderately nourished. No distress. Patient is oriented to person, place, and time. HENT:   Head: Normocephalic and atraumatic. Right Ear: External ear normal.   Left Ear: External ear normal.   Mouth/Throat: Oropharynx is clear and moist.  No oral thrush. Eyes: Conjunctivae are normal. Pupils are equal, round, and reactive to light. No scleral icterus. Neck: Neck supple. No JVD present. No tracheal deviation present. Cardiovascular: Normal rate, regular rhythm, normal heart sounds. No murmur heard. Pulmonary/Chest: Effort normal and breath sounds normal. No stridor.  No respiratory distress. Occasional expiratory wheezes. No rales. Patient exhibits no tenderness. Abdominal: Soft. Patient exhibits no distension. No tenderness. Musculoskeletal: Normal range of motion. Extremities: Patient exhibits no edema and no tenderness. Lymphadenopathy:  No cervical adenopathy. Neurological: Patient is alert and oriented to person, place, and time. Skin: Skin is warm and dry. Patient is not diaphoretic. Psychiatric: Patient  has a normal mood and affect. Patient behavior is normal.     Neck Circumference -   17  Mallampati - 4    Diagnostic Data:    Radiological Data:  Chest Xray:  Thu Sep 9, 2021  2:21 PM   PROCEDURE: XR CHEST (2 VW)   1. Normal heart size. Evidence for old, healed granulomatous disease. Minimal atelectatic/fibrotic stranding right midlung laterally. 2. No acute infiltrates or effusions are seen. The above radiological study films were reviewed by me. Pulmonary function tests: 11/03/2021            D-dimer result:      Arterial blood Gas performed on 11 January 2022 on room air:    CT angiogram of chest with and without contrast:  Wed Jan 12, 2022  1:12 PM   PROCEDURE: CTA CHEST W WO CONTRAST   CLINICAL INFORMATION: Elevated d-dimer, Dyspnea on exertion. 1. No evidence of pulmonary embolism. 2. Thickening off the interstitial lung markings and evidence for granulomatous disease in the right and left upper lobes. 3. Hypodensities in the right and left lobes of the liver not clearly seen on remote CT scan of the abdomen dated 16 may 2007. Please correlate with liver function tests. 4. Left adrenal adenoma, unchanged. 5. Mild splenomegaly. 6. Small hiatal hernia. .       [id number] result:       Pending    Assessment:  -Moderately Severe COPD- under good control.    -Hypodensities in the right and left lobes of the liver not clearly seen on remote CT scan of the abdomen dated 16 may 2007-he is waiting to be seen by Dr. Robert Choudhury MD from GI service next week  -Chronic use of tobacco smoking with the 2 packs of cigarettes per day for 36 years. He quit smoking on 10 January 2022. -Hypersomnia ( Excessive daytime sleepiness) may be due to obstructive sleep apnea Vs Inadequate sleep hygiene. Is currently waiting for a portable/home sleep study. His split-night sleep study requested the last visit was denied by medical insurance. -S/p uvulopalatopharyngoplasty surgery for his obstructive sleep apnea in the past.  His surgery was performed more than 20 years back. He never had a follow-up Sleep study to check the current status of sleep apnea. -Snoring with witnessed apneas and hypersomnia-to evaluate for obstructive sleep apnea. Recommendations/Plan:  -He was advised to keep his scheduled/planned Gastro Enterology consultation with Sonia Barkley MD for further evaluation of Hypodensities in the right and left lobes of the liver noted on his recent CT chest dated 1/12/22.  -Continue Stiolto Respimat ( Tiotropium bromide and Olodaterol) 2.5mcg/2.5mcg per actuation 2 puffs once daily.   -He is currently waiting for a home sleep study scheduled on 16 February 2022. He had subsequent follow-up with Ms. Ainsley Mack CNP regarding his sleep study. Patient need to reschedule his sleep clinic follow-up to 1 week after the home/portable sleep study.  -Continue Albuterol HFA 90mcg/Spray MDI, 2puffs  Q6Hprn.  -Continue Albuterol 2.5 mg by nebulization every 6 hourly as needed  -Follow Ociz-5-[id number] swab results at next clinic visit. -Lashonda Jimenez advised to continue prescribed inhalers and keep good compliance.   - Patient educated to update his pneumococcal vaccine with family physician and take influenza vaccine in coming season with out fail.   -Schedule patient for low dose CT scan of chest with out IV contrast as recommended by the  U.S. Preventive Services Task Force lung Cancer Screening 1 year from now.   -He was advised to make an appointment 10 years, or that would preclude treatment of an abnormality identified on CT, should not be screened due to lack of benefit.     To obtain maximal benefit from this screening, smoking cessation and long-term abstinence from smoking is critical

## 2022-02-08 ENCOUNTER — OFFICE VISIT (OUTPATIENT)
Dept: PULMONOLOGY | Age: 52
End: 2022-02-08
Payer: COMMERCIAL

## 2022-02-08 VITALS
WEIGHT: 254 LBS | HEIGHT: 75 IN | BODY MASS INDEX: 31.58 KG/M2 | DIASTOLIC BLOOD PRESSURE: 78 MMHG | HEART RATE: 68 BPM | SYSTOLIC BLOOD PRESSURE: 122 MMHG | OXYGEN SATURATION: 99 % | TEMPERATURE: 97.9 F

## 2022-02-08 DIAGNOSIS — Z87.891 PERSONAL HISTORY OF TOBACCO USE: ICD-10-CM

## 2022-02-08 DIAGNOSIS — R09.89 CHEST CONGESTION: ICD-10-CM

## 2022-02-08 DIAGNOSIS — Z87.891 PERSONAL HISTORY OF TOBACCO USE, PRESENTING HAZARDS TO HEALTH: ICD-10-CM

## 2022-02-08 DIAGNOSIS — J44.9 MODERATE COPD (CHRONIC OBSTRUCTIVE PULMONARY DISEASE) (HCC): Primary | ICD-10-CM

## 2022-02-08 PROCEDURE — 99214 OFFICE O/P EST MOD 30 MIN: CPT | Performed by: INTERNAL MEDICINE

## 2022-02-08 PROCEDURE — G0296 VISIT TO DETERM LDCT ELIG: HCPCS | Performed by: INTERNAL MEDICINE

## 2022-02-08 RX ORDER — ALBUTEROL SULFATE 90 UG/1
2 AEROSOL, METERED RESPIRATORY (INHALATION) EVERY 6 HOURS PRN
Qty: 1 EACH | Refills: 9 | Status: SHIPPED | OUTPATIENT
Start: 2022-02-08 | End: 2022-10-12 | Stop reason: SDUPTHER

## 2022-02-09 DIAGNOSIS — J44.9 MODERATE COPD (CHRONIC OBSTRUCTIVE PULMONARY DISEASE) (HCC): ICD-10-CM

## 2022-02-09 RX ORDER — ALBUTEROL SULFATE 90 UG/1
2 AEROSOL, METERED RESPIRATORY (INHALATION) EVERY 6 HOURS PRN
Qty: 1 EACH | Refills: 9 | OUTPATIENT
Start: 2022-02-09 | End: 2023-02-09

## 2022-02-10 ENCOUNTER — TELEPHONE (OUTPATIENT)
Dept: SLEEP CENTER | Age: 52
End: 2022-02-10

## 2022-02-10 ENCOUNTER — TELEPHONE (OUTPATIENT)
Dept: PULMONOLOGY | Age: 52
End: 2022-02-10

## 2022-02-10 NOTE — TELEPHONE ENCOUNTER
Emiliana Landeros,    An e-mail was received this morning that Kirk's HST study was denied. It appears that [facility] has now denied an in-lab split night study and HST. The Insurance denial is scanned under  for you to review. Please advise how you would like to proceed. Thank you!   Rosalba Rowley.

## 2022-02-11 DIAGNOSIS — J44.9 MODERATE COPD (CHRONIC OBSTRUCTIVE PULMONARY DISEASE) (HCC): ICD-10-CM

## 2022-02-11 DIAGNOSIS — R06.83 SNORING: Primary | ICD-10-CM

## 2022-02-11 DIAGNOSIS — G47.30 SLEEP APNEA, UNSPECIFIED TYPE: ICD-10-CM

## 2022-02-11 DIAGNOSIS — G47.10 HYPERSOMNIA: ICD-10-CM

## 2022-02-11 NOTE — TELEPHONE ENCOUNTER
Giovanni Leslie I would like to do peer to peer discussion with the . Please get the information regarding the contact phone number and case number if available as soon as possible.

## 2022-02-11 NOTE — TELEPHONE ENCOUNTER
I did a Peer to Peer review discussion with Dr. Georgena Kehr. M, MD at [facility] speciality. She reviewed the case with me. The home sleep study which was ordered was denied. Due to patient history of moderately severe COPD she approved patient for split-night sleep study to be performed in the sleep lab as soon as possible. She told me that she is going to put a new reference number on Monday on their website. Please inform patient regarding approval of split-night sleep study. Please schedule patient for sleep study and inform the date and time.

## 2022-02-22 DIAGNOSIS — R06.02 SOB (SHORTNESS OF BREATH): ICD-10-CM

## 2022-02-22 RX ORDER — ALBUTEROL SULFATE 2.5 MG/3ML
2.5 SOLUTION RESPIRATORY (INHALATION) EVERY 6 HOURS PRN
Qty: 120 EACH | Refills: 0 | Status: SHIPPED | OUTPATIENT
Start: 2022-02-22 | End: 2022-04-10 | Stop reason: SDUPTHER

## 2022-02-22 NOTE — TELEPHONE ENCOUNTER
Pratima Bryant called requesting a refill on the following medications:  Requested Prescriptions     Pending Prescriptions Disp Refills    albuterol (PROVENTIL) (2.5 MG/3ML) 0.083% nebulizer solution 120 each 0     Sig: Take 3 mLs by nebulization every 6 hours as needed for Wheezing       Date of last visit: 1/5/2022  Date of next visit (if applicable):Visit date not found  Date of last refill: 1/5/2022  Pharmacy Name: [address] [place] [address]      Jade Diehl [place], [place]

## 2022-03-01 ENCOUNTER — HOSPITAL ENCOUNTER (OUTPATIENT)
Age: 52
Discharge: HOME | End: 2022-03-01
Payer: COMMERCIAL

## 2022-03-01 ENCOUNTER — HOSPITAL ENCOUNTER (OUTPATIENT)
Dept: CT IMAGING | Age: 52
Discharge: HOME | End: 2022-03-01
Payer: COMMERCIAL

## 2022-03-01 DIAGNOSIS — R93.2 ABNORMAL LIVER DIAGNOSTIC IMAGING: ICD-10-CM

## 2022-03-01 DIAGNOSIS — L81.8 HISTORY OF TATTOO: ICD-10-CM

## 2022-03-01 DIAGNOSIS — R10.11 RUQ DISCOMFORT: ICD-10-CM

## 2022-03-01 LAB
ALBUMIN SERPL-MCNC: 4.4 G/DL (ref 3.5–5.1)
ALP BLD-CCNC: 82 U/L (ref 38–126)
ALT SERPL-CCNC: 30 U/L (ref 11–66)
ANION GAP SERPL CALCULATED.3IONS-SCNC: 10 MEQ/L (ref 8–16)
AST SERPL-CCNC: 22 U/L (ref 5–40)
BILIRUB SERPL-MCNC: 0.4 MG/DL (ref 0.3–1.2)
BUN BLDV-MCNC: 15 MG/DL (ref 7–22)
CALCIUM SERPL-MCNC: 9.2 MG/DL (ref 8.5–10.5)
CHLORIDE BLD-SCNC: 105 MEQ/L (ref 98–111)
CO2: 24 MEQ/L (ref 23–33)
CREAT SERPL-MCNC: 1 MG/DL (ref 0.4–1.2)
ERYTHROCYTE [DISTWIDTH] IN BLOOD BY AUTOMATED COUNT: 13.2 % (ref 11.5–14.5)
ERYTHROCYTE [DISTWIDTH] IN BLOOD BY AUTOMATED COUNT: 47.2 FL (ref 35–45)
FERRITIN: 583 NG/ML (ref 22–322)
GFR SERPL CREATININE-BSD FRML MDRD: 78 ML/MIN/1.73M2
GLUCOSE BLD-MCNC: 92 MG/DL (ref 70–108)
HAV IGM SER IA-ACNC: NEGATIVE
HCT VFR BLD CALC: 53.9 % (ref 42–52)
HEMOGLOBIN: 17.2 GM/DL (ref 14–18)
HEPATITIS B CORE IGM ANTIBODY: NEGATIVE
HEPATITIS B SURFACE ANTIGEN: NEGATIVE
HEPATITIS C ANTIBODY: NEGATIVE
INR BLD: 0.97 (ref 0.85–1.13)
MCH RBC QN AUTO: 31 PG (ref 26–33)
MCHC RBC AUTO-ENTMCNC: 31.9 GM/DL (ref 32.2–35.5)
MCV RBC AUTO: 97.1 FL (ref 80–94)
PLATELET # BLD: 255 THOU/MM3 (ref 130–400)
PMV BLD AUTO: 10.4 FL (ref 9.4–12.4)
POTASSIUM SERPL-SCNC: 4.4 MEQ/L (ref 3.5–5.2)
RBC # BLD: 5.55 MILL/MM3 (ref 4.7–6.1)
SODIUM BLD-SCNC: 139 MEQ/L (ref 135–145)
TOTAL PROTEIN: 6.7 G/DL (ref 6.1–8)
WBC # BLD: 7.5 THOU/MM3 (ref 4.8–10.8)

## 2022-03-01 PROCEDURE — 82977 ASSAY OF GGT: CPT

## 2022-03-01 PROCEDURE — 85027 COMPLETE CBC AUTOMATED: CPT

## 2022-03-01 PROCEDURE — 82728 ASSAY OF FERRITIN: CPT

## 2022-03-01 PROCEDURE — 85610 PROTHROMBIN TIME: CPT

## 2022-03-01 PROCEDURE — 74170 CT ABD WO CNTRST FLWD CNTRST: CPT

## 2022-03-01 PROCEDURE — 80053 COMPREHEN METABOLIC PANEL: CPT

## 2022-03-01 PROCEDURE — 6360000004 HC RX CONTRAST MEDICATION: Performed by: NURSE PRACTITIONER

## 2022-03-01 PROCEDURE — 80074 ACUTE HEPATITIS PANEL: CPT

## 2022-03-01 PROCEDURE — 36415 COLL VENOUS BLD VENIPUNCTURE: CPT

## 2022-03-01 PROCEDURE — 82105 ALPHA-FETOPROTEIN SERUM: CPT

## 2022-03-01 RX ADMIN — IOPAMIDOL 85 ML: 755 INJECTION, SOLUTION INTRAVENOUS at 10:11

## 2022-03-01 RX ADMIN — IOHEXOL 25 ML: 240 INJECTION, SOLUTION INTRATHECAL; INTRAVASCULAR; INTRAVENOUS; ORAL at 10:11

## 2022-03-02 LAB
AFP-TUMOR MARKER: 2.4 UG/L
GAMMA GLUTAMYL TRANSFERASE: 45 U/L (ref 8–69)

## 2022-03-17 ENCOUNTER — TELEPHONE (OUTPATIENT)
Dept: FAMILY MEDICINE CLINIC | Age: 52
End: 2022-03-17

## 2022-03-22 ENCOUNTER — HOSPITAL ENCOUNTER (OUTPATIENT)
Dept: SLEEP CENTER | Age: 52
Discharge: HOME | End: 2022-03-24
Payer: COMMERCIAL

## 2022-03-22 DIAGNOSIS — J44.9 MODERATE COPD (CHRONIC OBSTRUCTIVE PULMONARY DISEASE) (HCC): ICD-10-CM

## 2022-03-22 DIAGNOSIS — G47.10 HYPERSOMNIA: ICD-10-CM

## 2022-03-22 DIAGNOSIS — R06.83 SNORING: ICD-10-CM

## 2022-03-22 DIAGNOSIS — G47.30 SLEEP APNEA, UNSPECIFIED TYPE: ICD-10-CM

## 2022-03-22 PROCEDURE — 95810 POLYSOM 6/> YRS 4/> PARAM: CPT

## 2022-03-23 LAB — STATUS: NORMAL

## 2022-03-24 NOTE — PROGRESS NOTES
[address] [address]                               SLEEP STUDY REPORT    PATIENT NAME: Hola Staton                    :        1970  MED REC NO:   [id number]                           ROOM:  ACCOUNT NO:   [de-identified]                           ADMIT DATE: 2022  PROVIDER:     Christiano Gonzales MD    DATE OF STUDY:  2022    REFERRING PROVIDER:  Vinay Gonzalez MD    Patient height is 74 inches, weight is 252 pounds with a BMI of 32.4. HISTORY:  The patient is a 58-year-old gentleman who was recently  evaluated by me on 2022. The patient was diagnosed with  moderately severe COPD. The patient also had a history of hypersomnia. The patient was diagnosed with obstructive sleep apnea several years  back. The patient underwent uvulopalatopharyngoplasty surgery. The  patient never had post-procedure sleep study to check the status of  sleep apnea. Due to his ongoing hypersomnia, the patient is scheduled  for portable sleep study. However, due to his associated comorbidities  including moderately severe COPD, the patient's medical insurance denied  portable sleep study. The patient is subsequently scheduled for  split-night sleep study to diagnose and treat sleep apnea. METHODS:  The patient underwent digital polysomnography in compliance  with the standards and specifications from the AASM Manual including the  simultaneous recording of 3 EEG channels (F4-M1, C4-M1, and O2-M1 with  back up electrodes F3-M2, C3-M2, and O1-M2), 2 EOG channels (E1-M2, and  E2-M1,), EMG (chin, left & right leg), EKG, Nonin pulse oximetry with   less than 2 second averaging time, body position, airflow recorded by  oral-nasal thermal sensor and nasal air pressure transducer, plus  respiratory effort recorded by calibrated respiratory inductance  plethysmography (RIP), flow volume loop, sound and video.   Sleep staging  and scoring followed the standard put forth by the American Academy of  Sleep Medicine and utilized the 1B obstructive hypopnea event  desaturation of 4 percent or greater. INTERPRETATION:  This is a baseline sleep study, and the study was  performed on 03/22/2022. The study was started at 09:31 p.m. and was  terminated at 04:46 a.m. with a total recording time of 434.6 minutes,  sleep period time was 379.7 minutes, total sleep time was 293.5 minutes,  and overall sleep efficiency was 67.5% of total sleep time. The  patient's sleep onset latency was 54.8 minutes, and wake after sleep  onset was 86.2 minutes. REM sleep latency was 137.5 minutes. SLEEP STAGING AND DISTRIBUTION SUMMARY:  Revealed the patient spent 35.5  minutes in stage I consisting of 12.1% of total sleep time, 175 minutes  in stage II consisting of 59.6%, 28.5 minutes in stage III consisting of  9.7%, 54.5 minutes in REM sleep consisting of 18.6% of total sleep time. RESPIRATORY EVENT ANALYSIS:  Revealed the patient had a total of 0  apneas. The patient also had a total of 0 hypoapneas. The total number  of apneas and hypopneas recorded during the study was 0 with an  apnea-hypopnea index of 0. The patient's REM sleep apnea-hypopnea index  was 0. POSITION ANALYSIS:  Revealed the patient spent 30.9 minutes in supine  position, 136.5 minutes in left lateral position, 126.1 minutes in right  lateral position with a supine apnea-hypopnea index of 0, whereas right  lateral position apnea-hypopnea index was 0. PERIODIC LIMB MOVEMENT ANALYSIS:  Revealed the patient had a total of 60  periodic limb movements. Out of 60, 19 of them are associated with  arousals with a PLM index of 12.3. PLM arousal index is 3.9. The  patient had a total of 38 spontaneous arousals with a spontaneous  arousal index of 7.8.     OXYGEN SATURATION MONITORING:  Revealed the patient had a maximum oxygen  desaturation to 88% with a mean oxygen saturation of 90.9%. The patient  spent a total of 0.5 minutes below oxygen saturation less than 88%. EKG MONITORING:  Revealed normal sinus rhythm. The patient was found to have loud snoring during the sleep study. The sleep study was also scored using the 4A by using 3% desaturations. The AHI was found to be 0.8. IMPRESSION:  1. The patient's sleep study interpretation was limited due to poor  sleep efficiency of 67.5%. 2.  Loud snoring with no clinically significant obstructive sleep apnea. This is confirmed even after using the 4A AASM criteria to score  hypopneas. 3.  Decreased and delayed REM sleep. 4.  Periodic limb movements with no significant arousals. 5.  Hypersomnia of uncertain etiology. 6.  Status post uvulopalatopharyngoplasty surgery for his obstructive  sleep apnea more than 20 years back. 7.  Moderately severe COPD. RECOMMENDATIONS:  The patient should be scheduled for followup with my  clinic as soon as possible to discuss about the sleep study findings for  further management. Thanks to Darell Osler, MD, for giving me this opportunity to  participate in the care of this pleasant gentleman.         Mango Bender MD    D: 03/23/2022 17:19:18       T: 03/24/2022 14:44:03     [email or web address]  Job#: [id number]     Doc#: [id number]    CC:

## 2022-03-25 ENCOUNTER — TELEPHONE (OUTPATIENT)
Dept: FAMILY MEDICINE CLINIC | Age: 52
End: 2022-03-25

## 2022-03-25 NOTE — TELEPHONE ENCOUNTER
Mae Gould called back and states that [facility] paperwork was completed and he picked up from our office last week. Duplicate paperwork shredded.

## 2022-04-10 DIAGNOSIS — R06.02 SOB (SHORTNESS OF BREATH): ICD-10-CM

## 2022-04-11 RX ORDER — ALBUTEROL SULFATE 2.5 MG/3ML
2.5 SOLUTION RESPIRATORY (INHALATION) EVERY 6 HOURS PRN
Qty: 120 EACH | Refills: 0 | Status: SHIPPED | OUTPATIENT
Start: 2022-04-11 | End: 2022-06-02 | Stop reason: SDUPTHER

## 2022-04-11 NOTE — TELEPHONE ENCOUNTER
Scar Matias is requesting a refill on the following medications:  Requested Prescriptions     Pending Prescriptions Disp Refills    albuterol (PROVENTIL) (2.5 MG/3ML) 0.083% nebulizer solution 120 each 0     Sig: Take 3 mLs by nebulization every 6 hours as needed for Wheezing       Date of last visit: 1/5/2022  Date of next visit (if applicable):Visit date not found  Date of last refill: 2/22/2022  Pharmacy Name: [facility],  [place], [place]

## 2022-04-15 ENCOUNTER — PATIENT MESSAGE (OUTPATIENT)
Dept: FAMILY MEDICINE CLINIC | Age: 52
End: 2022-04-15

## 2022-04-18 DIAGNOSIS — J44.1 COPD EXACERBATION (HCC): Primary | ICD-10-CM

## 2022-04-18 DIAGNOSIS — J20.9 ACUTE BRONCHITIS, UNSPECIFIED ORGANISM: ICD-10-CM

## 2022-04-18 RX ORDER — PREDNISONE 10 MG/1
40 TABLET ORAL DAILY
Qty: 20 TABLET | Refills: 0 | Status: SHIPPED | OUTPATIENT
Start: 2022-04-18 | End: 2022-04-23

## 2022-04-18 RX ORDER — AZITHROMYCIN 250 MG/1
TABLET, FILM COATED ORAL
Qty: 1 PACKET | Refills: 0 | Status: SHIPPED | OUTPATIENT
Start: 2022-04-18 | End: 2022-04-26

## 2022-04-18 NOTE — TELEPHONE ENCOUNTER
From: Adama Coe  To: Dr. Samina Matute  Sent: 4/15/2022 11:15 AM EDT  Subject: Head cold    Hello doctor I hope you have been well. Im contacting you today because I have come down with a head cold of some kind. It hasnt effected my breathing at all yet and seems to be mostly a scratchy throat and a lot of drainage, slightly elevated fever. Ive only had it a few days but i thought, I would ask you for a antibiotic to keep it under control as I hope to keep it out of my chest. Im not sure if you would like to do a e visit for this since it was so close to the holiday but I will submit one if need be. Thank you for your excellent care and I hope you have a good holiday weekend.

## 2022-04-23 ENCOUNTER — HOSPITAL ENCOUNTER (OUTPATIENT)
Age: 52
Discharge: HOME | End: 2022-04-23
Payer: COMMERCIAL

## 2022-04-23 DIAGNOSIS — R79.89 ELEVATED FERRITIN LEVEL: ICD-10-CM

## 2022-04-23 LAB
ERYTHROCYTE [DISTWIDTH] IN BLOOD BY AUTOMATED COUNT: 12.7 % (ref 11.5–14.5)
ERYTHROCYTE [DISTWIDTH] IN BLOOD BY AUTOMATED COUNT: 44.6 FL (ref 35–45)
FERRITIN: 535 NG/ML (ref 22–322)
HCT VFR BLD CALC: 51.2 % (ref 42–52)
HEMOGLOBIN: 16.7 GM/DL (ref 14–18)
IRON SATURATION: 25 % (ref 20–50)
IRON: 72 UG/DL (ref 65–195)
MCH RBC QN AUTO: 31.2 PG (ref 26–33)
MCHC RBC AUTO-ENTMCNC: 32.6 GM/DL (ref 32.2–35.5)
MCV RBC AUTO: 95.5 FL (ref 80–94)
PLATELET # BLD: 255 THOU/MM3 (ref 130–400)
PMV BLD AUTO: 10 FL (ref 9.4–12.4)
RBC # BLD: 5.36 MILL/MM3 (ref 4.7–6.1)
TOTAL IRON BINDING CAPACITY: 284 UG/DL (ref 171–450)
WBC # BLD: 9.6 THOU/MM3 (ref 4.8–10.8)

## 2022-04-23 PROCEDURE — 85027 COMPLETE CBC AUTOMATED: CPT

## 2022-04-23 PROCEDURE — 83540 ASSAY OF IRON: CPT

## 2022-04-23 PROCEDURE — 83550 IRON BINDING TEST: CPT

## 2022-04-23 PROCEDURE — 82728 ASSAY OF FERRITIN: CPT

## 2022-04-26 ENCOUNTER — TELEPHONE (OUTPATIENT)
Dept: UROLOGY | Age: 52
End: 2022-04-26

## 2022-04-26 ENCOUNTER — OFFICE VISIT (OUTPATIENT)
Dept: UROLOGY | Age: 52
End: 2022-04-26
Payer: COMMERCIAL

## 2022-04-26 VITALS
SYSTOLIC BLOOD PRESSURE: 120 MMHG | WEIGHT: 259 LBS | DIASTOLIC BLOOD PRESSURE: 84 MMHG | HEIGHT: 74 IN | BODY MASS INDEX: 33.24 KG/M2

## 2022-04-26 DIAGNOSIS — N28.1 RENAL CYST: Primary | ICD-10-CM

## 2022-04-26 PROCEDURE — 99204 OFFICE O/P NEW MOD 45 MIN: CPT | Performed by: UROLOGY

## 2022-04-26 NOTE — PROGRESS NOTES
Hellen Leventhal MD.    [address]  [address]  [facility] [address]  Dept: [phone]  Dept Fax: [phone]  Loc: [address] [facility] Urology Office Note -     Patient:  Pretty San  YOB: 1970    The patient is a 47 y.o. male who presents today for evaluation of the following problems:   Chief Complaint   Patient presents with    New Patient     referred by YARI Thomas CNP for Adenoma of left adrenal gland,kidney lesion     referred/consultation requested by Perri Lea MD.    History of Present Illness:    Complex renal cyst  Left side  less than 2 cm  Had ct w and without contrast  No hematuria  No flank pain  +smoker    Mild LUTS       Requested/reviewed records from Perri Lea MD office and/or outside [de-identified]    (Patient's old records have been requested, reviewed and pertinent findings summarized in today's note.)    Procedures Today: N/A      Last several PSA's:  Lab Results   Component Value Date    PSA 0.93 10/07/2021       Last total testosterone:  No results found for: TESTOSTERONE    Urinalysis today:  No results found for this visit on 04/26/22. Last BUN and creatinine:  Lab Results   Component Value Date    BUN 15 03/01/2022     Lab Results   Component Value Date    CREATININE 1.0 03/01/2022         Imaging Reviewed during this Office Visit:   Corina Lind MD independently reviewed the images and verified the radiology reports from:    1. Lesions in the medial and lateral segment of the left hepatic lobe measuring 3.2 x 3.6 cm and 3.8 x 4.7 cm demonstrating enhancement characteristics typical for benign hemangiomas. No suspicious liver lesions are observed.       2.  A 14 x 16 mm lesion in the upper pole of the left kidney (series 3, image 28) is difficult to visualize/indeterminate by Hounsfield unit on the noncontrast images and appears to be enhancing following contrast administration. While this may represent    a complicated renal cyst, cross-sectional imaging either CT or MRI tailored for characterization of renal lesions is advised for further assessment.                 PAST MEDICAL, FAMILY AND SOCIAL HISTORY:  Past Medical History:   Diagnosis Date    COPD (chronic obstructive pulmonary disease) ([facility] [address]      Past Surgical History:   Procedure Laterality Date    APPENDECTOMY      COLONOSCOPY      Dr Geovanna Cleveland       Family History   Problem Relation Age of Onset    COPD Mother     Atrial Fibrillation Mother     COPD Father     Atrial Fibrillation Sister     Schizophrenia Brother     Lung Cancer Maternal Grandmother 80    Colon Cancer Neg Hx     Colon Polyps Neg Hx      Outpatient Medications Marked as Taking for the 22 encounter (Office Visit) with Yanelis Godoy MD   Medication Sig Dispense Refill    albuterol (PROVENTIL) (2.5 MG/3ML) 0.083% nebulizer solution Take 3 mLs by nebulization every 6 hours as needed for Wheezing 120 each 0    Cholecalciferol (VITAMIN D3) 50 MCG ( UT) CAPS Take 1 capsule by mouth daily      albuterol sulfate  (90 Base) MCG/ACT inhaler Inhale 2 puffs into the lungs every 6 hours as needed for Wheezing 1 each 9    ZINC PO Take by mouth      Dextromethorphan-guaiFENesin (MUCINEX DM PO) Take by mouth      tiotropium-olodaterol (STIOLTO) 2.5-2.5 MCG/ACT AERS Inhale 2 puffs into the lungs daily 1 each 11       Patient has no known allergies.   Social History     Tobacco Use   Smoking Status Former Smoker    Packs/day: 2.00    Years: 36.00    Pack years: 72.00    Types: Cigarettes    Quit date: 1/10/2022    Years since quittin.2   Smokeless Tobacco Never Used      (If patient a smoker, smoking cessation counseling offered)   Social History     Substance and Sexual Activity   Alcohol Use No       REVIEW OF SYSTEMS:  Constitutional: negative  Eyes: negative  Respiratory: negative  Cardiovascular: negative  Gastrointestinal: negative  Genitourinary: see HPI  Musculoskeletal: negative  Skin: negative   Neurological: negative  Hematological/Lymphatic: negative  Psychological: negative        Physical Exam:    This a 47 y.o. male  Vitals:    04/26/22 1056   BP: 120/84     Body mass index is 33.25 kg/m². Constitutional: Patient in no acute distress;       Assessment and Plan        1. Renal cyst               Plan:      Indeterminate ct scan- there is contrast enhancement of cyst. Will get MRI w and without contrast to further classify    Telephone results in a few weeks to discuss    Prescriptions Ordered:  No orders of the defined types were placed in this encounter. Orders Placed:  No orders of the defined types were placed in this encounter.            Yolanda Devine MD

## 2022-04-26 NOTE — TELEPHONE ENCOUNTER
Called pt back at this time for pulmonary rehab. PT states he is interested but still very busy with work and Dr vergara. Pt asked if we could call back in beginning of June. I asked pt if he just wanted to call us when he is ready and pt states \"I may forget and I am interested so could you call me back\". I told pt we could call him back and told him we would call around the beginning of June and the pt was good with that.

## 2022-04-26 NOTE — TELEPHONE ENCOUNTER
Patient scheduled for MRI ABDOMEN W WO  at [facility] MR on 5/24/2022 ARRIVAL OF 9AM FOR A 930AM SCAN. NPO 4 HOUR PRIOR.     Order mailed with instructions or given to the patient in the office

## 2022-05-19 ENCOUNTER — OFFICE VISIT (OUTPATIENT)
Dept: PULMONOLOGY | Age: 52
End: 2022-05-19
Payer: COMMERCIAL

## 2022-05-19 VITALS
WEIGHT: 263 LBS | SYSTOLIC BLOOD PRESSURE: 112 MMHG | BODY MASS INDEX: 33.75 KG/M2 | HEART RATE: 64 BPM | OXYGEN SATURATION: 98 % | HEIGHT: 74 IN | DIASTOLIC BLOOD PRESSURE: 70 MMHG | TEMPERATURE: 98.2 F

## 2022-05-19 DIAGNOSIS — G47.10 HYPERSOMNIA: ICD-10-CM

## 2022-05-19 DIAGNOSIS — G25.81 RLS (RESTLESS LEGS SYNDROME): Primary | ICD-10-CM

## 2022-05-19 DIAGNOSIS — G47.21 SLEEP-WAKE SCHEDULE DISORDER, DELAYED PHASE TYPE: ICD-10-CM

## 2022-05-19 DIAGNOSIS — G47.8 POOR SLEEP PATTERN: ICD-10-CM

## 2022-05-19 DIAGNOSIS — R06.83 SNORING: ICD-10-CM

## 2022-05-19 DIAGNOSIS — J44.9 MODERATE COPD (CHRONIC OBSTRUCTIVE PULMONARY DISEASE) (HCC): ICD-10-CM

## 2022-05-19 DIAGNOSIS — G47.61 PERIODIC LIMB MOVEMENT: ICD-10-CM

## 2022-05-19 PROCEDURE — 99214 OFFICE O/P EST MOD 30 MIN: CPT | Performed by: NURSE PRACTITIONER

## 2022-05-19 RX ORDER — ROPINIROLE 0.25 MG/1
0.25 TABLET, FILM COATED ORAL NIGHTLY
Qty: 100 TABLET | Refills: 2 | Status: SHIPPED | OUTPATIENT
Start: 2022-05-19 | End: 2022-08-17

## 2022-05-19 RX ORDER — ZOLPIDEM TARTRATE 5 MG/1
5 TABLET ORAL ONCE
Qty: 1 TABLET | Refills: 0 | Status: SHIPPED | OUTPATIENT
Start: 2022-05-19 | End: 2022-05-19

## 2022-05-19 ASSESSMENT — ENCOUNTER SYMPTOMS
EYES NEGATIVE: 1
DIARRHEA: 0
ABDOMINAL PAIN: 0
WHEEZING: 0
NAUSEA: 0
APNEA: 1
SHORTNESS OF BREATH: 1
VOMITING: 0
COUGH: 0

## 2022-05-19 NOTE — PROGRESS NOTES
[facility]    Sid Wilder, 47 y.o.  [id number]      Pt of Dr. Saturnino Mondragon Notes   Akbar Elise is here for a Split night follow up, he did not qualify to split   Study Results  Initial Study Date -  03/22/22  AHI -  0.0    TotalEvents - 0  (Apneas  0  Hypopneas 0  Central  0)  LM w/Arousals - 19  Sleep Efficiency - 67.5 % (Total Sleep Time - 293.5 min)  Time with Sats below 88% - 0.5 min  ESS: 4  SAQLI: 62  Interval History       Sid Wilder is a 47 y.o. old male who comes in to review the results of his recent sleep study, to answer questions and to explore options for treatment. he continues to have symptoms of snoring, periods of not breathing, tossing and turning, awakening in the middle of the night because of leg cramps, urination, feels sleepy during the day  Has had septoplasty in past and UPPP about 25 years ago for AUNG  Underlying COPD, not on home oxygen   Ferritin level is high at 535. Following with GI for \" spot Liver\" told benign  Following with nephrology for \" spot on kidney\" scheduled for MRI     Allergies  No Known Allergies  Meds  Current Outpatient Medications   Medication Sig Dispense Refill    rOPINIRole (REQUIP) 0.25 MG tablet Take 1 tablet by mouth nightly Dispense Requip 0.25mg 100pills. To take 1 tab PO 2 hour before sleep daily for 5 days,   then 2 tabs PO for 5 days. then 3 tabs PO for 5days   then continue 4 tabs PO until next follow up. 100 tablet 2    zolpidem (AMBIEN) 5 MG tablet Take 1 tablet by mouth once for 1 dose.  Take night of sleep study 1 tablet 0    albuterol (PROVENTIL) (2.5 MG/3ML) 0.083% nebulizer solution Take 3 mLs by nebulization every 6 hours as needed for Wheezing 120 each 0    Cholecalciferol (VITAMIN D3) 50 MCG (2000 UT) CAPS Take 1 capsule by mouth daily      albuterol sulfate  (90 Base) MCG/ACT inhaler Inhale 2 puffs into the lungs every 6 hours as needed for Wheezing 1 each 9    ZINC PO Take by mouth      Dextromethorphan-guaiFENesin (MUCINEX DM PO) Take by mouth      tiotropium-olodaterol (STIOLTO) 2.5-2.5 MCG/ACT AERS Inhale 2 puffs into the lungs daily 1 each 11     No current facility-administered medications for this visit. ROS  Review of Systems   Constitutional: Positive for fatigue. Negative for activity change, appetite change, chills, fever and unexpected weight change. HENT: Negative. Eyes: Negative. Respiratory: Positive for apnea and shortness of breath. Negative for cough and wheezing. Cardiovascular: Negative for chest pain, palpitations and leg swelling. Gastrointestinal: Negative for abdominal pain, diarrhea, nausea and vomiting. Genitourinary: Negative. Musculoskeletal: Positive for arthralgias. Skin: Negative. Neurological: Negative. Restless legs, cramping in legs   Hematological: Negative. Psychiatric/Behavioral: Positive for behavioral problems and sleep disturbance. Exam  Vitals -  /70   Pulse 64   Temp 98.2 °F (36.8 °C)   Ht 6' 2\" (1.88 m)   Wt 263 lb (119.3 kg)   SpO2 98% Comment: r/a  BMI 33.77 kg/m²    Body mass index is 33.77 kg/m². Oxygen level - Room Air  Physical Exam  Vitals and nursing note reviewed. Constitutional:       Appearance: Normal appearance. He is overweight. HENT:      Head: Normocephalic and atraumatic. Mouth/Throat:      Pharynx: Oropharynx is clear. Eyes:      Conjunctiva/sclera: Conjunctivae normal.   Pulmonary:      Effort: Pulmonary effort is normal. No tachypnea, bradypnea or respiratory distress. Skin:     Findings: No erythema or rash. Neurological:      Mental Status: He is alert and oriented to person, place, and time. Psychiatric:         Attention and Perception: Attention normal.         Mood and Affect: Mood normal.         Speech: Speech normal.         Behavior: Behavior normal.         Thought Content:  Thought content normal.         Cognition and Memory: Cognition normal.         Judgment: Judgment normal.        Assessment   Diagnosis Orders   1. RLS (restless legs syndrome)  rOPINIRole (REQUIP) 0.25 MG tablet    Baseline Diagnostic Sleep Study   2. Periodic limb movement  rOPINIRole (REQUIP) 0.25 MG tablet    Baseline Diagnostic Sleep Study   3. Poor sleep pattern  zolpidem (AMBIEN) 5 MG tablet   4. Snoring  Baseline Diagnostic Sleep Study   5. Hypersomnia  Baseline Diagnostic Sleep Study   6. Moderate COPD (chronic obstructive pulmonary disease) ([facility] [address]     7. Sleep-wake schedule disorder, delayed phase type  Baseline Diagnostic Sleep Study      Recommendations    -still high suspicion for AUNG based on loud snoring during study, oxygen desats to 88%, hypersomnia.   Poor sleep efficacy on PSG due to delayed sleep onset and had multiple awakenings due to uncontrolled PLMS / RLS   -start Requip, start 0.25 mg PO nightly, titrate to 1 mg   -reschedule in lab PSG with Ambien 5 mg PO once night of study to hopefully improve sleep efficacy for more reliable testing results   discussed next step is referral to ENT if next study comes back negative vs. Treatment for AUNG if positive   -educated on good sleep hygiene practices    F/u after PSG   -billing based on medical decision making-   Electronically signed by YARI Justin CNP on 5/19/2022 at 11:42 AM

## 2022-05-24 ENCOUNTER — HOSPITAL ENCOUNTER (OUTPATIENT)
Dept: MRI IMAGING | Age: 52
Discharge: HOME | End: 2022-05-24
Payer: COMMERCIAL

## 2022-05-24 DIAGNOSIS — N28.1 RENAL CYST: ICD-10-CM

## 2022-05-24 PROCEDURE — 6360000004 HC RX CONTRAST MEDICATION: Performed by: UROLOGY

## 2022-05-24 PROCEDURE — A9579 GAD-BASE MR CONTRAST NOS,1ML: HCPCS | Performed by: UROLOGY

## 2022-05-24 PROCEDURE — 74183 MRI ABD W/O CNTR FLWD CNTR: CPT

## 2022-05-24 RX ADMIN — GADOTERIDOL 20 ML: 279.3 INJECTION, SOLUTION INTRAVENOUS at 10:23

## 2022-05-31 ENCOUNTER — SCHEDULED TELEPHONE ENCOUNTER (OUTPATIENT)
Dept: UROLOGY | Age: 52
End: 2022-05-31
Payer: COMMERCIAL

## 2022-05-31 PROCEDURE — 99442 PR PHYS/QHP TELEPHONE EVALUATION 11-20 MIN: CPT | Performed by: UROLOGY

## 2022-05-31 NOTE — PROGRESS NOTES
Scar Matias is a 47 y.o. male evaluated via telephone on 5/31/2022 for renal mass  . Documentation:  I communicated with the patient and/or health care decision maker about mri results. Details of this discussion including any medical advice provided:     imaging independently reviewed by Percy Galindo MD and radiology report verified demonstrating     MRI ABDOMEN W WO CONTRAST    Result Date: 5/24/2022  PROCEDURE: MRI ABDOMEN W WO CONTRAST CLINICAL INFORMATION: Renal cyst COMPARISON: No prior study. TECHNIQUE: Multisequence and multiplanar MRI of the abdomen was performed without and with  contrast. T1 and T2 contrast information were emphasized. CONTRAST: 20  mL of ProHance  intravenously. FINDINGS: LOWER THORAX: No significant lower thoracic findings. HEPATOBILIARY: A 4.6 x 3.8 cm lesion in the lateral segment of the left hepatic lobe shows peripheral interrupted enhancement that relates to an hemangioma. A 4.3 x 3.3 cm lesion is seen in segment IVb of the liver has enhancement characteristics consistent with hemangioma. A 1.6 cm cyst is seen in the caudate lobe. Unremarkable hepatic surface morphology. The gallbladder, and biliary tree are unremarkable. PANCREAS AND SPLEEN: The pancreas is unremarkable. No peripancreatic abnormality. The spleen is not enlarged RETROPERITONEUM: There is a 1.3 x 1.6 cm T2 hypointense lesion in the superior pole of the left kidney that shows mild enhancement. A 1 cm nonenhancing cyst is seen in the midpole of the right kidney. A 9 mm nonenhancing cyst is seen in the inferior pole  left kidney. Parapelvic cysts are seen in the left kidney. No hydronephrosis. There is a 2.5 cm left adrenal adenoma and another 2.4 cm left adrenal adenoma. The right adrenal gland is unremarkable. BOWEL AND PERITONEUM:  No bowel obstruction or acute inflammatory bowel process.   VASCULAR: The abdominal aorta is not aneurysmal. The main portal vein, splenic vein, and superior mesenteric vein are patent. LYMPH NODES: No significantly enlarged lymph nodes are seen. BONES: No aggressive bony lesions noted. OTHER: None. 1. A 1.3 x 1.6 cm mildly enhancing lesion is seen in the superior pole of the left kidney and is concerning for a renal malignancy. 2. Other nonenhancing cysts are seen in both kidneys that are Bosniak 1 cysts. 3. Left adrenal adenomas are seen. 4. Left hepatic hemangiomas are noted. **This report has been created using voice recognition software. It may contain minor errors which are inherent in voice recognition technology. ** Final report electronically signed by Dr Page Craig on 5/24/2022 12:13 PM        Plan:    Hx of appendectomy  Concern for malignancy  Will schedule to discuss partial nephrectomy       Total Time: minutes 11-20 minutes    Zoie Moffettchanel was evaluated through a synchronous (real-time) audio encounter. Patient identification was verified at the start of the visit. He (or guardian if applicable) is aware that this is a billable service, which includes applicable co-pays. This visit was conducted with the patient's (and/or legal guardian's) verbal consent. He has not had a related appointment within my department in the past 7 days or scheduled within the next 24 hours. The patient was located at Home: [address] [place] [address]. The provider was located at Home ([address]       Note: not billable if this call serves to triage the patient into an appointment for the relevant concern    Linda Crowder MD

## 2022-06-02 ENCOUNTER — TELEPHONE (OUTPATIENT)
Dept: PULMONOLOGY | Age: 52
End: 2022-06-02

## 2022-06-02 DIAGNOSIS — Z98.890 HISTORY OF UVULOPALATOPHARYNGOPLASTY: ICD-10-CM

## 2022-06-02 DIAGNOSIS — G47.21 SLEEP-WAKE SCHEDULE DISORDER, DELAYED PHASE TYPE: ICD-10-CM

## 2022-06-02 DIAGNOSIS — G47.10 HYPERSOMNIA: ICD-10-CM

## 2022-06-02 DIAGNOSIS — R06.02 SOB (SHORTNESS OF BREATH): ICD-10-CM

## 2022-06-02 DIAGNOSIS — R06.83 SNORING: Primary | ICD-10-CM

## 2022-06-02 RX ORDER — ALBUTEROL SULFATE 2.5 MG/3ML
2.5 SOLUTION RESPIRATORY (INHALATION) EVERY 6 HOURS PRN
Qty: 120 EACH | Refills: 0 | Status: SHIPPED | OUTPATIENT
Start: 2022-06-02 | End: 2022-09-14 | Stop reason: SDUPTHER

## 2022-06-02 NOTE — TELEPHONE ENCOUNTER
Jeramie Padilla is requesting a refill on the following medications:  Requested Prescriptions     Pending Prescriptions Disp Refills    albuterol (PROVENTIL) (2.5 MG/3ML) 0.083% nebulizer solution 120 each 0     Sig: Take 3 mLs by nebulization every 6 hours as needed for Wheezing       Date of last visit: 1/5/2022  Date of next visit (if applicable):Visit date not found  Date of last refill: 4/11/2022  Pharmacy Name: [facility],  [place], [place]

## 2022-06-02 NOTE — TELEPHONE ENCOUNTER
Lanora Share, APRN - CNP  [facility] Pulmonary Medicine  Pool  Please notify patient of denial. Darius Brown this time I will treat his night time periodic limp movement / Restless legs  with the requip medication and follow up to see how he is sleeping.  I also recommend ENT referral for upper airway evaluation due to the snoring and h/o UPPP surgery . Please ask who he would like to see             Previous Messages       ----- Message -----   From: Anibal Kumari   Sent: 5/27/2022   7:49 AM EDT   To: Harjinder Lilliana Reyes APRN - CNP, *   Subject: DENIAL                                           [employer] has denied the PSG for medical necessity. The explanation is as follows: Your doctor is checking to see if you stop breathing for short periods of time while you are sleeping (sleep apnea). Your doctor ordered a test to see how a person breathes when asleep. Your doctor also told us that you were tested for this condition in the past. Your doctor ordered a test to check for this condition again. This test is needed if your prior test was inadequate or if you have had significant weight gain since the last test. We reviewed the notes we have. The notes do not show that your prior test was inadequate. The notes also do not show that you have gained a significant amount of weight. Therefore, this test is not medically necessary. We used [facility] Guidelines titled Sleep Disorder Management, Polysomnography and Home Sleep Testing to make this decision. You may view this guideline at [facility].. A denial letter will be mailed out in 7-10 days that will outline the options to dispute the denial.     Thank you,   Prudencio Chatman.

## 2022-06-07 NOTE — TELEPHONE ENCOUNTER
Calling pt back at this time like he requested. Pt states he has to have surgery in a few weeks so unsure when he would be able to do this. Pt states he will call us when he is ready and interested in doing pulmonary rehab.

## 2022-06-10 ENCOUNTER — TELEMEDICINE (OUTPATIENT)
Dept: UROLOGY | Age: 52
End: 2022-06-10
Payer: COMMERCIAL

## 2022-06-10 DIAGNOSIS — N28.89 RENAL MASS, LEFT: Primary | ICD-10-CM

## 2022-06-10 PROCEDURE — 99214 OFFICE O/P EST MOD 30 MIN: CPT | Performed by: UROLOGY

## 2022-06-10 NOTE — PROGRESS NOTES
Dana Durand MD.    [address] [facility] [address]  Dept: [phone]  Dept Fax: [phone]  Loc: [address] [facility] Urology Office Note -     Patient:  Rosmery Mcmahon  YOB: 1970    The patient is a 47 y.o. male who presents today for evaluation of the following problems:   Chief Complaint   Patient presents with    Follow-up     discuss nephrectomy     referred/consultation requested by Toma Watt MD.    History of Present Illness:    Complex renal cyst  Left side  less than 2 cm  Had ct w and without contrast  No hematuria  No flank pain  +smoker    Mild LUTS     I independently reviewed and verified the images and reports from:    MRI ABDOMEN W WO CONTRAST    Result Date: 5/24/2022  PROCEDURE: MRI ABDOMEN W WO CONTRAST CLINICAL INFORMATION: Renal cyst COMPARISON: No prior study. TECHNIQUE: Multisequence and multiplanar MRI of the abdomen was performed without and with  contrast. T1 and T2 contrast information were emphasized. CONTRAST: 20  mL of ProHance  intravenously. FINDINGS: LOWER THORAX: No significant lower thoracic findings. HEPATOBILIARY: A 4.6 x 3.8 cm lesion in the lateral segment of the left hepatic lobe shows peripheral interrupted enhancement that relates to an hemangioma. A 4.3 x 3.3 cm lesion is seen in segment IVb of the liver has enhancement characteristics consistent with hemangioma. A 1.6 cm cyst is seen in the caudate lobe. Unremarkable hepatic surface morphology. The gallbladder, and biliary tree are unremarkable. PANCREAS AND SPLEEN: The pancreas is unremarkable. No peripancreatic abnormality. The spleen is not enlarged RETROPERITONEUM: There is a 1.3 x 1.6 cm T2 hypointense lesion in the superior pole of the left kidney that shows mild enhancement. A 1 cm nonenhancing cyst is seen in the midpole of the right kidney.  A 9 mm nonenhancing cyst is seen in the inferior pole left kidney. Parapelvic cysts are seen in the left kidney. No hydronephrosis. There is a 2.5 cm left adrenal adenoma and another 2.4 cm left adrenal adenoma. The right adrenal gland is unremarkable. BOWEL AND PERITONEUM:  No bowel obstruction or acute inflammatory bowel process. VASCULAR: The abdominal aorta is not aneurysmal. The main portal vein, splenic vein, and superior mesenteric vein are patent. LYMPH NODES: No significantly enlarged lymph nodes are seen. BONES: No aggressive bony lesions noted. OTHER: None. 1. A 1.3 x 1.6 cm mildly enhancing lesion is seen in the superior pole of the left kidney and is concerning for a renal malignancy. 2. Other nonenhancing cysts are seen in both kidneys that are Bosniak 1 cysts. 3. Left adrenal adenomas are seen. 4. Left hepatic hemangiomas are noted. **This report has been created using voice recognition software. It may contain minor errors which are inherent in voice recognition technology. ** Final report electronically signed by Dr Jory Morley on 5/24/2022 12:13 PM        Requested/reviewed records from Stuart Alonso MD office and/or outside [de-identified]    (Patient's old records have been requested, reviewed and pertinent findings summarized in today's note.)    Procedures Today: N/A      Last several PSA's:  Lab Results   Component Value Date    PSA 0.93 10/07/2021       Last total testosterone:  No results found for: TESTOSTERONE    Urinalysis today:  No results found for this visit on 06/10/22. Last BUN and creatinine:  Lab Results   Component Value Date    BUN 15 03/01/2022     Lab Results   Component Value Date    CREATININE 1.0 03/01/2022         Imaging Reviewed during this Office Visit:   Lawyer Marian JACQUES MD independently reviewed the images and verified the radiology reports from:    1.  Lesions in the medial and lateral segment of the left hepatic lobe measuring 3.2 x 3.6 cm and 3.8 x 4.7 cm demonstrating enhancement characteristics typical for benign hemangiomas. No suspicious liver lesions are observed.       2. A 14 x 16 mm lesion in the upper pole of the left kidney (series 3, image 28) is difficult to visualize/indeterminate by Hounsfield unit on the noncontrast images and appears to be enhancing following contrast administration. While this may represent    a complicated renal cyst, cross-sectional imaging either CT or MRI tailored for characterization of renal lesions is advised for further assessment.                 PAST MEDICAL, FAMILY AND SOCIAL HISTORY:  Past Medical History:   Diagnosis Date    COPD (chronic obstructive pulmonary disease) ([facility] [address]      Past Surgical History:   Procedure Laterality Date    APPENDECTOMY      COLONOSCOPY  2022    Dr Katie Ochoa ENDOSCOPY       Family History   Problem Relation Age of Onset    COPD Mother     Atrial Fibrillation Mother     COPD Father     Atrial Fibrillation Sister     Schizophrenia Brother     Lung Cancer Maternal Grandmother 80    Colon Cancer Neg Hx     Colon Polyps Neg Hx      Outpatient Medications Marked as Taking for the 6/10/22 encounter (Telemedicine) with Dena Olguin MD   Medication Sig Dispense Refill    Naproxen Sodium (ALEVE PO) Take by mouth as needed      albuterol (PROVENTIL) (2.5 MG/3ML) 0.083% nebulizer solution Take 3 mLs by nebulization every 6 hours as needed for Wheezing 120 each 0    Cholecalciferol (VITAMIN D3) 50 MCG (2000 UT) CAPS Take 1 capsule by mouth daily      albuterol sulfate  (90 Base) MCG/ACT inhaler Inhale 2 puffs into the lungs every 6 hours as needed for Wheezing 1 each 9    ZINC PO Take by mouth      Dextromethorphan-guaiFENesin (MUCINEX DM PO) Take by mouth in the morning and at bedtime       tiotropium-olodaterol (STIOLTO) 2.5-2.5 MCG/ACT AERS Inhale 2 puffs into the lungs daily 1 each 11       Patient has no known allergies.   Social History     Tobacco Use Smoking Status Current Every Day Smoker    Packs/day: 1.00    Years: 36.00    Pack years: 36.00    Types: Cigarettes    Last attempt to quit: 1/10/2022    Years since quittin.4   Smokeless Tobacco Never Used      (If patient a smoker, smoking cessation counseling offered)   Social History     Substance and Sexual Activity   Alcohol Use No       REVIEW OF SYSTEMS:  Constitutional: negative  Eyes: negative  Respiratory: negative  Cardiovascular: negative  Gastrointestinal: negative  Genitourinary: see HPI  Musculoskeletal: negative  Skin: negative   Neurological: negative  Hematological/Lymphatic: negative  Psychological: negative        Physical Exam:    This a 47 y.o. male  There were no vitals filed for this visit. There is no height or weight on file to calculate BMI. Constitutional: Patient in no acute distress;       Assessment and Plan        1. Renal mass, left               Plan:      I discussed and reviewed the images with the patient. I discussed al the options including observation, ablative treatment (cryo or RF ablation), radical and partial nephrectomy. I discussed all the risks, benefits, possible outcomes and complications of the above options. I answered all the patient's questions. For partial nephrectomy, I discussed risk of bleeding, conversion to radical nephrectomy or conversion to open approach. Hx of COPD    I discussed the very low chance that a tumor less than 2 cm will metastasize. I discussed that observation is a safe short-term option. I discussed the biopsy as well and I discussed the risks of that. We agreed that observation in the short-term is may be the best option right now. We will repeat an MRI in 6 months and see if there is any growth. I did assure him that if it did grow slightly that it would not preclude him from surgical removal of the tumor with good success and cure rate.       Prescriptions Ordered:  No orders of the defined types were placed in this encounter. Orders Placed:  Orders Placed This Encounter   Procedures    MRI ABDOMEN W WO CONTRAST     Standing Status:   Future     Standing Expiration Date:   6/10/2023     Order Specific Question:   STAT Creatinine as needed:     Answer:   No     Order Specific Question:   Specify organ? Answer:   Moe Henriquez M.D, MD    Patient being evaluated by a Virtual Visit (video visit) encounter to address concerns as mentioned above. A caregiver was present when appropriate. Due to this being a TeleHealth encounter ([facility]), evaluation of the following organ systems was limited: Vitals/Constitutional/EENT/Resp/CV/GI//MS/Neuro/Skin/Heme-Lymph-Imm. Pursuant to the emergency declaration under the [address] [place] [address], [address] [place] [address] wa[facility] authority and the "[employer] and [employer] Act, this Virtual Visit was conducted with patient's (and/or legal guardian's) consent, to reduce the patient's risk of exposure to COVID-19 and provide necessary medical care. The patient (and/or legal guardian) has also been advised to contact this office for worsening conditions or problems, and seek emergency medical treatment and/or call 911 if deemed necessary. I was located at my [facility] office. Patient was located at home. Services were provided through a video synchronous discussion virtually to substitute for in-person clinic visit. This encounter was initiated by the patient.

## 2022-06-21 ENCOUNTER — TELEPHONE (OUTPATIENT)
Dept: UROLOGY | Age: 52
End: 2022-06-21

## 2022-07-14 ENCOUNTER — HOSPITAL ENCOUNTER (OUTPATIENT)
Age: 52
Discharge: HOME | End: 2022-07-14
Payer: COMMERCIAL

## 2022-07-14 DIAGNOSIS — D18.03 LIVER HEMANGIOMA: ICD-10-CM

## 2022-07-14 DIAGNOSIS — R79.89 ELEVATED FERRITIN LEVEL: ICD-10-CM

## 2022-07-14 LAB
ALBUMIN SERPL-MCNC: 4.4 G/DL (ref 3.5–5.1)
ALP BLD-CCNC: 82 U/L (ref 38–126)
ALT SERPL-CCNC: 39 U/L (ref 11–66)
ANION GAP SERPL CALCULATED.3IONS-SCNC: 11 MEQ/L (ref 8–16)
AST SERPL-CCNC: 27 U/L (ref 5–40)
BILIRUB SERPL-MCNC: 0.4 MG/DL (ref 0.3–1.2)
BUN BLDV-MCNC: 18 MG/DL (ref 7–22)
CALCIUM SERPL-MCNC: 9.3 MG/DL (ref 8.5–10.5)
CHLORIDE BLD-SCNC: 106 MEQ/L (ref 98–111)
CO2: 23 MEQ/L (ref 23–33)
CREAT SERPL-MCNC: 1.1 MG/DL (ref 0.4–1.2)
FERRITIN: 597 NG/ML (ref 22–322)
GAMMA GLUTAMYL TRANSFERASE: 56 U/L (ref 8–69)
GFR SERPL CREATININE-BSD FRML MDRD: 70 ML/MIN/1.73M2
GLUCOSE BLD-MCNC: 91 MG/DL (ref 70–108)
POTASSIUM SERPL-SCNC: 4.5 MEQ/L (ref 3.5–5.2)
SODIUM BLD-SCNC: 140 MEQ/L (ref 135–145)
TOTAL PROTEIN: 6.4 G/DL (ref 6.1–8)

## 2022-07-14 PROCEDURE — 82977 ASSAY OF GGT: CPT

## 2022-07-14 PROCEDURE — 81256 HFE GENE: CPT

## 2022-07-14 PROCEDURE — 80053 COMPREHEN METABOLIC PANEL: CPT

## 2022-07-14 PROCEDURE — 82728 ASSAY OF FERRITIN: CPT

## 2022-07-14 PROCEDURE — 36415 COLL VENOUS BLD VENIPUNCTURE: CPT

## 2022-07-21 LAB — MISC. #1 REFERENCE GROUP TEST: NORMAL

## 2022-08-11 ENCOUNTER — E-VISIT (OUTPATIENT)
Dept: FAMILY MEDICINE CLINIC | Age: 52
End: 2022-08-11
Payer: COMMERCIAL

## 2022-08-11 DIAGNOSIS — J44.1 COPD EXACERBATION (HCC): Primary | ICD-10-CM

## 2022-08-11 PROCEDURE — 99422 OL DIG E/M SVC 11-20 MIN: CPT | Performed by: FAMILY MEDICINE

## 2022-08-11 RX ORDER — PREDNISONE 20 MG/1
20 TABLET ORAL 2 TIMES DAILY
Qty: 10 TABLET | Refills: 0 | Status: SHIPPED | OUTPATIENT
Start: 2022-08-11 | End: 2022-08-16

## 2022-08-11 RX ORDER — DOXYCYCLINE HYCLATE 100 MG
100 TABLET ORAL 2 TIMES DAILY
Qty: 20 TABLET | Refills: 0 | Status: SHIPPED | OUTPATIENT
Start: 2022-08-11 | End: 2022-08-21

## 2022-08-11 ASSESSMENT — LIFESTYLE VARIABLES
SMOKING_YEARS: 35
SMOKING_STATUS: NO, BUT I USED TO SMOKE
PACKS_PER_DAY: 2

## 2022-08-11 NOTE — PROGRESS NOTES
HPI: as per patient provided history; called and spoke with patient. has low grade temp, coughing more, more wheezing. No SOB. Started 1 week ago. Has chest tightness. He does not think it is his heart. 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 Using albuterol more. Exam: N/A (electronic visit)  ASSESSMENT/PLAN:  1. COPD exacerbation (HCC)  New symptoms consistent with COPD exacerbation. He declines work-up for the chest tightness. Treat with prednisone and doxycycline. Continue albuterol  - predniSONE (DELTASONE) 20 MG tablet; Take 1 tablet by mouth in the morning and 1 tablet before bedtime. Do all this for 5 days. Dispense: 10 tablet; Refill: 0  - doxycycline hyclate (VIBRA-TABS) 100 MG tablet; Take 1 tablet by mouth in the morning and 1 tablet before bedtime. Do all this for 10 days. Dispense: 20 tablet; Refill: 0    Patient instructed to call the office if worsens, or fails to improve as anticipated. 12 minutes were spent on the digital evaluation and management of this patient.

## 2022-09-13 ENCOUNTER — TELEMEDICINE (OUTPATIENT)
Dept: FAMILY MEDICINE CLINIC | Age: 52
End: 2022-09-13
Payer: COMMERCIAL

## 2022-09-13 DIAGNOSIS — U07.1 COVID-19: ICD-10-CM

## 2022-09-13 PROCEDURE — 99213 OFFICE O/P EST LOW 20 MIN: CPT | Performed by: NURSE PRACTITIONER

## 2022-09-13 RX ORDER — ASCORBIC ACID 500 MG
500 TABLET ORAL DAILY
Qty: 30 TABLET | Refills: 3 | Status: SHIPPED | OUTPATIENT
Start: 2022-09-13

## 2022-09-13 RX ORDER — METHYLPREDNISOLONE 4 MG/1
4 TABLET ORAL SEE ADMIN INSTRUCTIONS
Qty: 1 KIT | Refills: 0 | Status: SHIPPED | OUTPATIENT
Start: 2022-09-13 | End: 2022-09-19

## 2022-09-13 RX ORDER — ACETAMINOPHEN 160 MG
1 TABLET,DISINTEGRATING ORAL DAILY
Qty: 30 CAPSULE | Refills: 0 | Status: SHIPPED | OUTPATIENT
Start: 2022-09-13

## 2022-09-13 ASSESSMENT — ENCOUNTER SYMPTOMS
WHEEZING: 0
SHORTNESS OF BREATH: 0
EYES NEGATIVE: 1
SINUS PRESSURE: 0
GASTROINTESTINAL NEGATIVE: 1
COUGH: 1
CHEST TIGHTNESS: 0
SORE THROAT: 0

## 2022-09-13 NOTE — PROGRESS NOTES
Belinda Sánchez (:  1970) is a Established patient, here for evaluation of the following:    Assessment & Plan   Below is the assessment and plan developed based on review of pertinent history, physical exam, labs, studies, and medications. 1. COVID-19  -     nirmatrelvir/ritonavir (PAXLOVID) 20 x 150 MG & 10 x 100MG TBPK; Take 3 tablets (two 150 mg nirmatrelvir and one 100 mg ritonavir tablets) by mouth every 12 hours for 5 days. , Disp-30 tablet, R-0Normal  -     methylPREDNISolone (MEDROL, AGUEDA,) 4 MG tablet; Take 1 tablet by mouth See Admin Instructions for 6 days Take by mouth., Disp-1 kit, R-0Normal  -     Cholecalciferol (VITAMIN D3) 50 MCG ( UT) CAPS; Take 1 capsule by mouth daily, Disp-30 capsule, R-0Normal  -     vitamin C (ASCORBIC ACID) 500 MG tablet; Take 1 tablet by mouth daily, Disp-30 tablet, R-3Normal  Return if symptoms worsen or fail to improve. Monitor for progressing symptoms. Drink plenty of fluids. Tylenol of pain and fever as needed. CDC isolation recommendations reviewed. Reviewed s/s requiring emergent evaluation and treatment. Subjective   HPI Patient name identified and he gives verbal consent for a video visit. Patient had 2 positive at home covid tests. He is complaining of mild fever, body aches, cough, and fatigue. His symptoms started yesterday. He denies SOB, chest pain and tightness, n/v/d. He has been taking Robitussin DM for his symptoms. He has concerns for progressive symptoms 2/2 COPD. He is able to talk in complete sentences. Review of Systems   Constitutional:  Positive for chills, fatigue and fever. HENT:  Positive for congestion and postnasal drip. Negative for ear pain, sinus pressure and sore throat. Eyes: Negative. Respiratory:  Positive for cough. Negative for chest tightness, shortness of breath and wheezing. Cardiovascular: Negative. Gastrointestinal: Negative. Neurological: Negative.          Objective   Patient-Reported Vitals  No data recorded     Physical Exam  [INSTRUCTIONS:  \"[x]\" Indicates a positive item  \"[]\" Indicates a negative item  -- DELETE ALL ITEMS NOT EXAMINED]    Constitutional: [x] Appears well-developed and well-nourished [x] No apparent distress      [] Abnormal -     Mental status: [x] Alert and awake  [x] Oriented to person/place/time [x] Able to follow commands    [] Abnormal -     Eyes:   EOM    [x]  Normal    [] Abnormal -   Sclera  [x]  Normal    [] Abnormal -          Discharge []  None visible   [] Abnormal -     HENT: [x] Normocephalic, atraumatic  [] Abnormal -   [x] Mouth/Throat: Mucous membranes are moist    External Ears [x] Normal  [] Abnormal -    Neck: [x] No visualized mass [] Abnormal -     Pulmonary/Chest: [x] Respiratory effort normal   [x] No visualized signs of difficulty breathing or respiratory distress        [] Abnormal -      Musculoskeletal:   [] Normal gait with no signs of ataxia         [] Normal range of motion of neck        [] Abnormal -     Neurological:        [x] No Facial Asymmetry (Cranial nerve 7 motor function) (limited exam due to video visit)          [x] No gaze palsy        [] Abnormal -          Skin:        [x] No significant exanthematous lesions or discoloration noted on facial skin         [] Abnormal -            Psychiatric:       [x] Normal Affect [] Abnormal -        [] No Hallucinations    Other pertinent observable physical exam findings:-             Belinda Sánchez, was evaluated through a synchronous (real-time) audio-video encounter. The patient (or guardian if applicable) is aware that this is a billable service, which includes applicable co-pays. This Virtual Visit was conducted with patient's (and/or legal guardian's) consent. The visit was conducted pursuant to the emergency declaration under the [address], [address] [place] [address] authority and the [employer] and [employer] General Act.   Patient identification was verified, and a caregiver was present when appropriate. The patient was located at Home: [address] [place] [address]. Provider was located at [facility] ([address] [place] [address]): [address] [place] [address]

## 2022-09-14 DIAGNOSIS — J44.9 MODERATE COPD (CHRONIC OBSTRUCTIVE PULMONARY DISEASE) (HCC): ICD-10-CM

## 2022-09-14 DIAGNOSIS — R06.02 SOB (SHORTNESS OF BREATH): ICD-10-CM

## 2022-09-14 RX ORDER — ALBUTEROL SULFATE 2.5 MG/3ML
2.5 SOLUTION RESPIRATORY (INHALATION) EVERY 6 HOURS PRN
Qty: 120 EACH | Refills: 0 | Status: SHIPPED | OUTPATIENT
Start: 2022-09-14

## 2022-09-14 NOTE — TELEPHONE ENCOUNTER
Mayra Gonzalez called requesting a refill on the following medications:  Requested Prescriptions     Pending Prescriptions Disp Refills    albuterol (PROVENTIL) (2.5 MG/3ML) 0.083% nebulizer solution 120 each 0     Sig: Take 3 mLs by nebulization every 6 hours as needed for Wheezing       Date of last visit: 9/13/2022  Date of next visit (if applicable):Visit date not found  Date of last refill:   Pharm[facility]: [facility]     Pt is +covid     Thanks,  Juanita Haddad MA

## 2022-09-17 ENCOUNTER — E-VISIT (OUTPATIENT)
Dept: FAMILY MEDICINE CLINIC | Age: 52
End: 2022-09-17
Payer: COMMERCIAL

## 2022-09-17 DIAGNOSIS — U07.1 COVID-19: Primary | ICD-10-CM

## 2022-09-17 DIAGNOSIS — J44.1 COPD EXACERBATION (HCC): ICD-10-CM

## 2022-09-17 PROCEDURE — 99421 OL DIG E/M SVC 5-10 MIN: CPT | Performed by: FAMILY MEDICINE

## 2022-09-17 ASSESSMENT — LIFESTYLE VARIABLES
PACKS_PER_DAY: 1.5
SMOKING_YEARS: 35
SMOKING_STATUS: NO, BUT I USED TO SMOKE

## 2022-09-18 RX ORDER — DOXYCYCLINE HYCLATE 100 MG
100 TABLET ORAL 2 TIMES DAILY
Qty: 20 TABLET | Refills: 0 | Status: SHIPPED | OUTPATIENT
Start: 2022-09-18 | End: 2022-09-28

## 2022-09-18 NOTE — PROGRESS NOTES
HPI: as per patient provided history  Exam: N/A (electronic visit)  ASSESSMENT/PLAN:  1. COVID-19  COVID infection in a patient with COPD. Continue Paxlovid and complete the course of Medrol. We will start doxycycline. Consider another course of steroids if borderline breathing. 2. COPD exacerbation (HCC)  - doxycycline hyclate (VIBRA-TABS) 100 MG tablet; Take 1 tablet by mouth 2 times daily for 10 days  Dispense: 20 tablet; Refill: 0    Patient instructed to call the office if worsens, or fails to improve as anticipated. 5-10 minutes were spent on the digital evaluation and management of this patient.

## 2022-09-19 RX ORDER — PREDNISONE 20 MG/1
20 TABLET ORAL 2 TIMES DAILY
Qty: 10 TABLET | Refills: 0 | Status: SHIPPED | OUTPATIENT
Start: 2022-09-19 | End: 2022-09-24

## 2022-09-19 RX ORDER — ALBUTEROL SULFATE 90 UG/1
2 AEROSOL, METERED RESPIRATORY (INHALATION) EVERY 6 HOURS PRN
Qty: 1 EACH | Refills: 9 | OUTPATIENT
Start: 2022-09-19 | End: 2023-09-19

## 2022-10-12 DIAGNOSIS — J44.9 MODERATE COPD (CHRONIC OBSTRUCTIVE PULMONARY DISEASE) (HCC): ICD-10-CM

## 2022-10-12 NOTE — TELEPHONE ENCOUNTER
Received refill request for Albuterol. Medication was last ordered by amanda. Medication was last ordered on 2/8/22 with 9 refills. Patient was last seen in the office 5/19/22. Patient has a scheduled follow up 10/13/22. Medication needs to be sent to [address] [facility].

## 2022-10-13 RX ORDER — ALBUTEROL SULFATE 90 UG/1
2 AEROSOL, METERED RESPIRATORY (INHALATION) EVERY 6 HOURS PRN
Qty: 1 EACH | Refills: 9 | Status: SHIPPED | OUTPATIENT
Start: 2022-10-13 | End: 2023-10-13

## 2022-11-08 ENCOUNTER — TELEMEDICINE (OUTPATIENT)
Dept: FAMILY MEDICINE CLINIC | Age: 52
End: 2022-11-08
Payer: COMMERCIAL

## 2022-11-08 DIAGNOSIS — J44.1 COPD EXACERBATION (HCC): Primary | ICD-10-CM

## 2022-11-08 PROCEDURE — 99213 OFFICE O/P EST LOW 20 MIN: CPT | Performed by: FAMILY MEDICINE

## 2022-11-08 RX ORDER — PREDNISONE 20 MG/1
20 TABLET ORAL 2 TIMES DAILY
Qty: 10 TABLET | Refills: 0 | Status: SHIPPED | OUTPATIENT
Start: 2022-11-08 | End: 2022-11-13

## 2022-11-08 RX ORDER — AZITHROMYCIN 250 MG/1
250 TABLET, FILM COATED ORAL SEE ADMIN INSTRUCTIONS
Qty: 6 TABLET | Refills: 0 | Status: SHIPPED | OUTPATIENT
Start: 2022-11-08 | End: 2022-11-13

## 2022-11-08 ASSESSMENT — ENCOUNTER SYMPTOMS
COUGH: 1
SHORTNESS OF BREATH: 1
WHEEZING: 0

## 2022-11-08 NOTE — PROGRESS NOTES
2022    TELEHEALTH EVALUATION -- Audio/Visual (During BGKIB-79 public health emergency)    HPI:    Nia Long (:  1970) has requested an audio/video evaluation for the following concern(s):    COPD:  tired and fatigued. On stiolto and uses albuterol. Coughing more than normal.  Mild SOB. Mild wheezing relieved by coughing. Doing albuterol 2-3 times per day. Review of Systems   Constitutional:  Positive for fatigue. Negative for chills and fever. Respiratory:  Positive for cough and shortness of breath. Negative for wheezing. Prior to Visit Medications    Medication Sig Taking?  Authorizing Provider   albuterol sulfate HFA (PROVENTIL;VENTOLIN;PROAIR) 108 (90 Base) MCG/ACT inhaler Inhale 2 puffs into the lungs every 6 hours as needed for Wheezing Yes YARI Hanson CNP   albuterol (PROVENTIL) (2.5 MG/3ML) 0.083% nebulizer solution Take 3 mLs by nebulization every 6 hours as needed for Wheezing Yes Orbie Lefort, MD   Cholecalciferol (VITAMIN D3) 50 MCG (2000 UT) CAPS Take 1 capsule by mouth daily Yes YARI Wilson CNP   vitamin C (ASCORBIC ACID) 500 MG tablet Take 1 tablet by mouth daily Yes YARI Wilson CNP   Naproxen Sodium (ALEVE PO) Take by mouth as needed Yes Historical Provider, MD   ZINC PO Take by mouth Yes Historical Provider, MD   tiotropium-olodaterol (STIOLTO) 2.5-2.5 MCG/ACT AERS Inhale 2 puffs into the lungs daily Yes Brandin Wyatt MD   Dextromethorphan-guaiFENesin (MUCINEX DM PO) Take by mouth in the morning and at bedtime   Historical Provider, MD       Social History     Tobacco Use    Smoking status: Every Day     Packs/day: 1.00     Years: 36.00     Pack years: 36.00     Types: Cigarettes     Last attempt to quit: 1/10/2022     Years since quittin.8    Smokeless tobacco: Never   Vaping Use    Vaping Use: Never used   Substance Use Topics    Alcohol use: No    Drug use: No        No Known Allergies,   Past Medical History: or fail to improve. Timothy Childs, was evaluated through a synchronous (real-time) audio-video encounter. The patient (or guardian if applicable) is aware that this is a billable service, which includes applicable co-pays. This Virtual Visit was conducted with patient's (and/or legal guardian's) consent. The visit was conducted pursuant to the emergency declaration under the [address] [place] [address] and the [employer] and [employer] General Act. Patient identification was verified, and a caregiver was present when appropriate. The patient was located at [place], [place]  Provider was located at [facility] ([place] [address]): [address] [place] [address]. Total time spent on this encounter: Not billed by time    --Rickey Hernandez MD on 11/8/2022 at 11:55 AM    An electronic signature was used to authenticate this note.

## 2022-11-17 DIAGNOSIS — R06.02 SOB (SHORTNESS OF BREATH): ICD-10-CM

## 2022-11-17 RX ORDER — ALBUTEROL SULFATE 2.5 MG/3ML
SOLUTION RESPIRATORY (INHALATION)
Qty: 360 ML | OUTPATIENT
Start: 2022-11-17

## 2022-11-23 DIAGNOSIS — R06.02 SOB (SHORTNESS OF BREATH): ICD-10-CM

## 2022-11-23 RX ORDER — ALBUTEROL SULFATE 2.5 MG/3ML
SOLUTION RESPIRATORY (INHALATION)
Qty: 360 ML | OUTPATIENT
Start: 2022-11-23

## 2022-11-27 DIAGNOSIS — R06.02 SOB (SHORTNESS OF BREATH): ICD-10-CM

## 2022-11-28 RX ORDER — ALBUTEROL SULFATE 2.5 MG/3ML
2.5 SOLUTION RESPIRATORY (INHALATION) EVERY 6 HOURS PRN
Qty: 120 EACH | Refills: 0 | Status: SHIPPED | OUTPATIENT
Start: 2022-11-28

## 2022-11-28 NOTE — TELEPHONE ENCOUNTER
Zhang Sanchez is requesting a refill on the following medications:  Requested Prescriptions     Pending Prescriptions Disp Refills    albuterol (PROVENTIL) (2.5 MG/3ML) 0.083% nebulizer solution 120 each 0     Sig: Take 3 mLs by nebulization every 6 hours as needed for Wheezing       Date of last visit: 11/8/2022  Date of next visit (if applicable):Visit date not found  Date of last refill: 9/14/2022  Pharmacy Name: [facility],  [place], [place]

## 2022-12-06 DIAGNOSIS — Z77.22 TOBACCO SMOKE EXPOSURE: ICD-10-CM

## 2022-12-06 DIAGNOSIS — J44.9 MODERATE COPD (CHRONIC OBSTRUCTIVE PULMONARY DISEASE) (HCC): ICD-10-CM

## 2022-12-06 NOTE — TELEPHONE ENCOUNTER
Received refill request for Stiolto.   Medication was last ordered by Dr Gonzales.   Medication was last ordered on 1/11/22 with 11 refills.     Patient was last seen in the office 5/19/22.   Does patient have a scheduled follow up?: yes - 1/24/23    Medication needs to be sent to [id number] [place], [address] -  [phone] [facility] [phone].    **Phoned [facility] in [place] and patient is out of refills for this medication.     Thank you, please advise!    Patient's Allergies:  No Known Allergies

## 2022-12-07 ENCOUNTER — HOSPITAL ENCOUNTER (OUTPATIENT)
Dept: MRI IMAGING | Age: 52
Discharge: HOME | End: 2022-12-07
Payer: COMMERCIAL

## 2022-12-07 DIAGNOSIS — N28.89 RENAL MASS, LEFT: ICD-10-CM

## 2022-12-07 PROCEDURE — 6360000004 HC RX CONTRAST MEDICATION: Performed by: UROLOGY

## 2022-12-07 PROCEDURE — A9579 GAD-BASE MR CONTRAST NOS,1ML: HCPCS | Performed by: UROLOGY

## 2022-12-07 PROCEDURE — 74183 MRI ABD W/O CNTR FLWD CNTR: CPT

## 2022-12-07 RX ADMIN — GADOTERIDOL 20 ML: 279.3 INJECTION, SOLUTION INTRAVENOUS at 10:18

## 2022-12-14 ENCOUNTER — TELEMEDICINE (OUTPATIENT)
Dept: UROLOGY | Age: 52
End: 2022-12-14
Payer: COMMERCIAL

## 2022-12-14 DIAGNOSIS — N28.1 RENAL CYST: ICD-10-CM

## 2022-12-14 DIAGNOSIS — N28.89 RENAL MASS, LEFT: Primary | ICD-10-CM

## 2022-12-14 PROCEDURE — 99214 OFFICE O/P EST MOD 30 MIN: CPT | Performed by: UROLOGY

## 2022-12-14 NOTE — PROGRESS NOTES
[address] [place] [address]  Dept: [phone]  Dept Fax: [phone]  Loc: [address] [facility] Office Note -     Patient:  Maribel Loya  YOB: 1970    The patient is a 47 y.o. male who presents today for evaluation of the following problems:   Chief Complaint   Patient presents with    Follow-up     Renal cyst mri prior     referred/consultation requested by Elba Del Rio MD.    History of Present Illness:    Complex renal cyst  Left side  less than 2 cm  Had ct w and without contrast  No hematuria  No flank pain  +smoker    Mild LUTS     I independently reviewed and verified the images and reports from:    MRI ABDOMEN W WO CONTRAST    Result Date: 5/24/2022  PROCEDURE: MRI ABDOMEN W WO CONTRAST CLINICAL INFORMATION: Renal cyst COMPARISON: No prior study. TECHNIQUE: Multisequence and multiplanar MRI of the abdomen was performed without and with  contrast. T1 and T2 contrast information were emphasized. CONTRAST: 20  mL of ProHance  intravenously. FINDINGS: LOWER THORAX: No significant lower thoracic findings. HEPATOBILIARY: A 4.6 x 3.8 cm lesion in the lateral segment of the left hepatic lobe shows peripheral interrupted enhancement that relates to an hemangioma. A 4.3 x 3.3 cm lesion is seen in segment IVb of the liver has enhancement characteristics consistent with hemangioma. A 1.6 cm cyst is seen in the caudate lobe. Unremarkable hepatic surface morphology. The gallbladder, and biliary tree are unremarkable. PANCREAS AND SPLEEN: The pancreas is unremarkable. No peripancreatic abnormality. The spleen is not enlarged RETROPERITONEUM: There is a 1.3 x 1.6 cm T2 hypointense lesion in the superior pole of the left kidney that shows mild enhancement. A 1 cm nonenhancing cyst is seen in the midpole of the right kidney. A 9 mm nonenhancing cyst is seen in the inferior pole  left kidney. Parapelvic cysts are seen in the left kidney. No hydronephrosis. There is a 2.5 cm left adrenal adenoma and another 2.4 cm left adrenal adenoma. The right adrenal gland is unremarkable. BOWEL AND PERITONEUM:  No bowel obstruction or acute inflammatory bowel process. VASCULAR: The abdominal aorta is not aneurysmal. The main portal vein, splenic vein, and superior mesenteric vein are patent. LYMPH NODES: No significantly enlarged lymph nodes are seen. BONES: No aggressive bony lesions noted. OTHER: None. 1. A 1.3 x 1.6 cm mildly enhancing lesion is seen in the superior pole of the left kidney and is concerning for a renal malignancy. 2. Other nonenhancing cysts are seen in both kidneys that are Bosniak 1 cysts. 3. Left adrenal adenomas are seen. 4. Left hepatic hemangiomas are noted. **This report has been created using voice recognition software. It may contain minor errors which are inherent in voice recognition technology. ** Final report electronically signed by Dr Demetrice Caldwell on 5/24/2022 12:13 PM      I independently reviewed and verified the images and reports from:    MRI ABDOMEN W WO CONTRAST    Result Date: 12/7/2022  PROCEDURE: MRI ABDOMEN W WO CONTRAST CLINICAL INFORMATION: Renal mass. COMPARISON: MRI abdomen 5/24/2022. CT abdomen 3/1/2022. TECHNIQUE: Routine MRI abdomen without and with IV contrast.  CONTRAST: 20 cc ProHance contrast. FINDINGS: Lung bases: Unremarkable. Liver/gallbladder/bilary tree: The 4 cm lesion in the medial segment of the left hepatic lobe and a 4.5 cm lesion in the lateral segment of the left hepatic lobe are stable and demonstrate signal intensity and enhancement characteristics consistent with benign hemangiomas. A nonenhancing 15 mm simple cyst in the quadrate lobe is stable. No new liver lesions are identified. No gallstones or biliary ductal dilatation is observed.  Pancreas: Normal. Spleen : Normal. Adrenal glands: Stable benign 2 cm left adrenal adenoma and left adrenal gland thickening. The right adrenal gland is unremarkable. Kidneys/ ureters/: The exophytic 16 mm T2 hypointense lesion in the upper pole of the left kidney is stable in size with no enhancement following contrast administration. Additional tiny bilateral T2 hyperintense nonenhancing renal lesions are stable. No  hydronephrosis or hydroureter is present. Gastrointestinal:  No dilated loops of small or large bowel are visualized. No free fluid or fluid collections are observed. Retroperitoneum / lymph nodes: The aorta is not dilated. No lymphadenopathy is present. Musculoskeletal: The visualized skeletal structures appear intact. 1. Stable 16 mm T2 hypointense exophytic lesion arising from the upper pole of the left kidney. The lesion does not appear to enhance following contrast administration however patient respiratory motion limits the quality of the postcontrast subtracted images. CT of the abdomen with and without IV contrast utilizing a renal lesion protocol may more helpful for confirmation as it is less susceptible to patient motion. 2. Stable benign hemangiomas in the medial and lateral segments of the left hepatic lobe. Chronic findings are discussed. **This report has been created using voice recognition software. It may contain minor errors which are inherent in voice recognition technology. ** Final report electronically signed by Dr Maycol Blankenship on 12/7/2022 10:36 AM        Requested/reviewed records from Shanice Beverly MD office and/or outside physician/EMR    (Patient's old records have been requested, reviewed and pertinent findings summarized in today's note.)    Procedures Today: N/A      Last several PSA's:  Lab Results   Component Value Date    PSA 0.93 10/07/2021       Last total testosterone:  No results found for: TESTOSTERONE    Urinalysis today:  No results found for this visit on 12/14/22.     Last BUN and creatinine:  Lab Results   Component Value Date    BUN 18 07/14/2022 Lab Results   Component Value Date    CREATININE 1.1 07/14/2022         Imaging Reviewed during this Office Visit:   Olga Ward M.D, MD independently reviewed the images and verified the radiology reports from:    1. Lesions in the medial and lateral segment of the left hepatic lobe measuring 3.2 x 3.6 cm and 3.8 x 4.7 cm demonstrating enhancement characteristics typical for benign hemangiomas. No suspicious liver lesions are observed. 2. A 14 x 16 mm lesion in the upper pole of the left kidney (series 3, image 28) is difficult to visualize/indeterminate by Hounsfield unit on the noncontrast images and appears to be enhancing following contrast administration. While this may represent    a complicated renal cyst, cross-sectional imaging either CT or MRI tailored for characterization of renal lesions is advised for further assessment.                  PAST MEDICAL, FAMILY AND SOCIAL HISTORY:  Past Medical History:   Diagnosis Date    COPD (chronic obstructive pulmonary disease) (HCC)      Past Surgical History:   Procedure Laterality Date    APPENDECTOMY      COLONOSCOPY  2022    Dr Waldemar Mandujano ENDOSCOPY       Family History   Problem Relation Age of Onset    COPD Mother     Atrial Fibrillation Mother     COPD Father     Atrial Fibrillation Sister     Schizophrenia Brother     Lung Cancer Maternal Grandmother 80    Colon Cancer Neg Hx     Colon Polyps Neg Hx      Outpatient Medications Marked as Taking for the 12/14/22 encounter (Telemedicine) with Cassi Rivers MD   Medication Sig Dispense Refill    tiotropium-olodaterol (STIOLTO) 2.5-2.5 MCG/ACT AERS Inhale 2 puffs into the lungs daily 1 each 5    albuterol (PROVENTIL) (2.5 MG/3ML) 0.083% nebulizer solution Take 3 mLs by nebulization every 6 hours as needed for Wheezing 120 each 0    albuterol sulfate HFA (PROVENTIL;VENTOLIN;PROAIR) 108 (90 Base) MCG/ACT inhaler Inhale 2 puffs into the lungs every 6 hours as needed for Wheezing 1 each 9    Cholecalciferol (VITAMIN D3) 50 MCG (2000 UT) CAPS Take 1 capsule by mouth daily 30 capsule 0    vitamin C (ASCORBIC ACID) 500 MG tablet Take 1 tablet by mouth daily 30 tablet 3    Naproxen Sodium (ALEVE PO) Take by mouth as needed      ZINC PO Take by mouth         Patient has no known allergies. Social History     Tobacco Use   Smoking Status Every Day    Packs/day: 1.00    Years: 36.00    Pack years: 36.00    Types: Cigarettes    Last attempt to quit: 1/10/2022    Years since quittin.9   Smokeless Tobacco Never      (If patient a smoker, smoking cessation counseling offered)   Social History     Substance and Sexual Activity   Alcohol Use No       REVIEW OF SYSTEMS:  Constitutional: negative  Eyes: negative  Respiratory: negative  Cardiovascular: negative  Gastrointestinal: negative  Genitourinary: see HPI  Musculoskeletal: negative  Skin: negative   Neurological: negative  Hematological/Lymphatic: negative  Psychological: negative        Physical Exam:    This a 47 y.o. male  There were no vitals filed for this visit. There is no height or weight on file to calculate BMI. Constitutional: Patient in no acute distress;       Assessment and Plan        1. Renal mass, left    2. Renal cyst               Plan:      I discussed and reviewed the images with the patient. I discussed al the options including observation, ablative treatment (cryo or RF ablation), radical and partial nephrectomy. I discussed all the risks, benefits, possible outcomes and complications of the above options. I answered all the patient's questions. For partial nephrectomy, I discussed risk of bleeding, conversion to radical nephrectomy or conversion to open approach. MRI shows stable mass; no change in size: 16 mm  Hx of COPD  I discussed there is a change it could be benign  I discussed the very low chance that a tumor less than 2 cm will metastasize.   I discussed that observation is a safe short-term option. I discussed the biopsy as well and I discussed the risks of that. We agreed that observation in the short-term is may be the best option right now. We will repeat an MRI in 6 months and see if there is any growth. I did assure him that if it did grow slightly that it would not preclude him from surgical removal of the tumor with good success and cure rate. I would wait until 2-3 cm before deciding to do surgery      Prescriptions Ordered:  No orders of the defined types were placed in this encounter. Orders Placed:  Orders Placed This Encounter   Procedures    MRI ABDOMEN W WO CONTRAST     Standing Status:   Future     Standing Expiration Date:   12/14/2023     Order Specific Question:   STAT Creatinine as needed:     Answer:   No     Order Specific Question:   Specify organ? Answer:   Saint Dill M.D, MD    Patient being evaluated by a Virtual Visit (video visit) encounter to address concerns as mentioned above. A caregiver was present when appropriate. Due to this being a TeleHealth encounter (During [place] [address] public [facility] emergency), evaluation of the following organ systems was limited: Vitals/Constitutional/EENT/Resp/CV/GI//MS/Neuro/Skin/Heme-Lymph-Imm. Pursuant to the emergency declaration under the [address] [facility], [address] [place] [address] waiver authority and the [employer] Act, this Virtual Visit was conducted with patient's (and/or legal guardian's) consent, to reduce the patient's risk of exposure to COVID-19 and provide necessary medical care. The patient (and/or legal guardian) has also been advised to contact this office for worsening conditions or problems, and seek emergency medical treatment and/or call 911 if deemed necessary. I was located at my [place] office. Patient was located at home.     Services were provided through a video synchronous discussion virtually to substitute for in-person clinic visit. This encounter was initiated by the patient.

## 2023-01-03 ENCOUNTER — E-VISIT (OUTPATIENT)
Dept: FAMILY MEDICINE CLINIC | Age: 53
End: 2023-01-03
Payer: COMMERCIAL

## 2023-01-03 DIAGNOSIS — J44.1 COPD EXACERBATION (HCC): ICD-10-CM

## 2023-01-03 PROCEDURE — 99421 OL DIG E/M SVC 5-10 MIN: CPT | Performed by: FAMILY MEDICINE

## 2023-01-03 RX ORDER — AZITHROMYCIN 250 MG/1
250 TABLET, FILM COATED ORAL SEE ADMIN INSTRUCTIONS
Qty: 6 TABLET | Refills: 0 | Status: SHIPPED | OUTPATIENT
Start: 2023-01-03 | End: 2023-01-08

## 2023-01-03 RX ORDER — PREDNISONE 20 MG/1
20 TABLET ORAL 2 TIMES DAILY
Qty: 10 TABLET | Refills: 0 | Status: SHIPPED | OUTPATIENT
Start: 2023-01-03 | End: 2023-01-08

## 2023-01-03 ASSESSMENT — LIFESTYLE VARIABLES
PACKS_PER_DAY: 2
SMOKING_STATUS: NO, I'M A FORMER SMOKER
SMOKING_YEARS: 35

## 2023-01-03 NOTE — PROGRESS NOTES
HPI: as per patient provided history  Exam: N/A (electronic visit)  ASSESSMENT/PLAN:  1. COPD exacerbation (HCC)  COPD exacerbation. May have viral or bacterial illness leading to exacerbation. Treat with below meds. - predniSONE (DELTASONE) 20 MG tablet; Take 1 tablet by mouth 2 times daily for 5 days  Dispense: 10 tablet; Refill: 0  - azithromycin (ZITHROMAX) 250 MG tablet; Take 1 tablet by mouth See Admin Instructions for 5 days 500mg on day 1 followed by 250mg on days 2 - 5  Dispense: 6 tablet; Refill: 0    Patient instructed to call the office if worsens, or fails to improve as anticipated. 5-10 minutes were spent on the digital evaluation and management of this patient.

## 2023-01-12 ENCOUNTER — OFFICE VISIT (OUTPATIENT)
Dept: FAMILY MEDICINE CLINIC | Age: 53
End: 2023-01-12
Payer: COMMERCIAL

## 2023-01-12 VITALS
SYSTOLIC BLOOD PRESSURE: 130 MMHG | RESPIRATION RATE: 16 BRPM | WEIGHT: 269 LBS | BODY MASS INDEX: 34.54 KG/M2 | DIASTOLIC BLOOD PRESSURE: 72 MMHG | HEART RATE: 72 BPM

## 2023-01-12 DIAGNOSIS — B37.0 THRUSH, ORAL: Primary | ICD-10-CM

## 2023-01-12 PROBLEM — F17.200 SMOKER: Status: RESOLVED | Noted: 2021-10-07 | Resolved: 2023-01-12

## 2023-01-12 PROCEDURE — 99213 OFFICE O/P EST LOW 20 MIN: CPT | Performed by: NURSE PRACTITIONER

## 2023-01-12 SDOH — ECONOMIC STABILITY: FOOD INSECURITY: WITHIN THE PAST 12 MONTHS, THE FOOD YOU BOUGHT JUST DIDN'T LAST AND YOU DIDN'T HAVE MONEY TO GET MORE.: NEVER TRUE

## 2023-01-12 SDOH — ECONOMIC STABILITY: FOOD INSECURITY: WITHIN THE PAST 12 MONTHS, YOU WORRIED THAT YOUR FOOD WOULD RUN OUT BEFORE YOU GOT MONEY TO BUY MORE.: NEVER TRUE

## 2023-01-12 ASSESSMENT — ENCOUNTER SYMPTOMS
COLOR CHANGE: 1
SHORTNESS OF BREATH: 1
SORE THROAT: 0
COUGH: 1
VOICE CHANGE: 1

## 2023-01-12 ASSESSMENT — PATIENT HEALTH QUESTIONNAIRE - PHQ9
SUM OF ALL RESPONSES TO PHQ QUESTIONS 1-9: 0
1. LITTLE INTEREST OR PLEASURE IN DOING THINGS: 0
SUM OF ALL RESPONSES TO PHQ QUESTIONS 1-9: 0
2. FEELING DOWN, DEPRESSED OR HOPELESS: 0
SUM OF ALL RESPONSES TO PHQ QUESTIONS 1-9: 0
SUM OF ALL RESPONSES TO PHQ9 QUESTIONS 1 & 2: 0
SUM OF ALL RESPONSES TO PHQ QUESTIONS 1-9: 0

## 2023-01-12 ASSESSMENT — SOCIAL DETERMINANTS OF HEALTH (SDOH): HOW HARD IS IT FOR YOU TO PAY FOR THE VERY BASICS LIKE FOOD, HOUSING, MEDICAL CARE, AND HEATING?: NOT HARD AT ALL

## 2023-01-12 NOTE — PROGRESS NOTES
Mark Taylor (:  1970) is a 46 y.o. male,Established patient, here for evaluation of the following chief complaint(s):  Rash (Mouth, concerned w thrush )         ASSESSMENT/PLAN:  1. Thrush, oral  - Acute  - Start oral nystatin suspension  - Potentially related to recent antibiotic use  - Notify office if no improvement in symptoms  -     nystatin (MYCOSTATIN) 124459 UNIT/ML suspension; Take 5 mLs by mouth 4 times daily for 14 days, Oral, 4 TIMES DAILY Starting Thu 2023, Until Thu 2023, For 14 days, Disp-280 mL, R-0, Normal    Return if symptoms worsen or fail to improve. Subjective   SUBJECTIVE/OBJECTIVE:  Patient reports with concern for thrush. Has been battling for several weeks. Treating with peroxide and salt. Has had several respiratory infections recently which required antibiotics and steroids. Rash  This is a new problem. The current episode started 1 to 4 weeks ago. The problem has been waxing and waning since onset. Location: tongue. The rash is characterized by pain (white). Associated symptoms include coughing and shortness of breath (chronic, improving). Pertinent negatives include no fever or sore throat. Treatments tried: peroxide and salt. The treatment provided mild relief. Review of Systems   Constitutional:  Negative for fever. HENT:  Positive for postnasal drip and voice change. Negative for sore throat. Respiratory:  Positive for cough and shortness of breath (chronic, improving). Skin:  Positive for color change and rash (discoloration of tongue). Objective   Physical Exam  Constitutional:       General: He is not in acute distress. Appearance: Normal appearance. HENT:      Head: Normocephalic and atraumatic. Right Ear: External ear normal.      Left Ear: External ear normal.      Nose: No nasal deformity or rhinorrhea. Mouth/Throat:      Lips: Pink. No lesions. Mouth: No angioedema. Tongue: Lesions present. Pharynx: No pharyngeal swelling or oropharyngeal exudate. Tonsils: No tonsillar exudate. Eyes:      General: Lids are normal.         Right eye: No discharge. Left eye: No discharge. Conjunctiva/sclera: Conjunctivae normal.   Pulmonary:      Effort: No accessory muscle usage or respiratory distress. Breath sounds: No stridor. Musculoskeletal:      Cervical back: Normal range of motion. Skin:     Coloration: Skin is not pale. Findings: No rash. Neurological:      General: No focal deficit present. Mental Status: He is alert. Mental status is at baseline. Psychiatric:         Mood and Affect: Mood normal.         Behavior: Behavior is cooperative. An electronic signature was used to authenticate this note.     --Elisabet Gan, YARI - CNP

## 2023-01-16 ENCOUNTER — HOSPITAL ENCOUNTER (OUTPATIENT)
Dept: CT IMAGING | Age: 53
Discharge: HOME | End: 2023-01-16
Payer: COMMERCIAL

## 2023-01-16 ENCOUNTER — HOSPITAL ENCOUNTER (OUTPATIENT)
Dept: PULMONOLOGY | Age: 53
End: 2023-01-16
Payer: COMMERCIAL

## 2023-01-16 DIAGNOSIS — Z87.891 PERSONAL HISTORY OF TOBACCO USE: ICD-10-CM

## 2023-01-16 PROCEDURE — 71271 CT THORAX LUNG CANCER SCR C-: CPT

## 2023-01-31 ENCOUNTER — HOSPITAL ENCOUNTER (OUTPATIENT)
Dept: PULMONOLOGY | Age: 53
Discharge: HOME | End: 2023-01-31
Payer: COMMERCIAL

## 2023-01-31 DIAGNOSIS — Z87.891 PERSONAL HISTORY OF TOBACCO USE, PRESENTING HAZARDS TO HEALTH: ICD-10-CM

## 2023-01-31 DIAGNOSIS — Z87.891 PERSONAL HISTORY OF TOBACCO USE: ICD-10-CM

## 2023-01-31 DIAGNOSIS — J44.9 MODERATE COPD (CHRONIC OBSTRUCTIVE PULMONARY DISEASE) (HCC): ICD-10-CM

## 2023-01-31 DIAGNOSIS — R09.89 CHEST CONGESTION: ICD-10-CM

## 2023-01-31 PROCEDURE — 94060 EVALUATION OF WHEEZING: CPT

## 2023-01-31 NOTE — PROGRESS NOTES
Prescreening performed prior to testing. The following symptoms may indicate COVID-19 infection:        One of the following criteria:   Temperature taken, patient temperature was 98.8 F. Fever greater 100.0 F -no  New onset cough -  no  New onset shortness of breath -no  New onset difficulty breathing -no        And/or   Two or more of the following criteria:  New onset muscle aches -no  New onset headache -no  New onset sore throat -no  New onset loss of smell/taste -no  New onset diarrhea -no    Patient's screening was negative. PFT will be performed.

## 2023-01-31 NOTE — PROGRESS NOTES
[facility]    Patient: Xu Lopez, 46 y.o.   : 1970  2023    Patient of Dr. Karey Stroud   Patient presents with    Follow-up     1 year with CT chest and cherie         HPI  Viry Arizmendi is here for follow up for Moderate COPD. At patient's last appointment with Dr. Patricia Jorgensen, he was referred to Dr. Sonia Ramirez for hypodensities seen in the right and left liver lobes on CT Chest. He continues to follow with GI. He is here with CT Lung Screen that revealed LUNGRADS assessment value 2. He is also here with Alys Barthel with improved FEV1 and FVC. He reports that stiolto has not been working as well as it used to. He admits that he has had frequent breathing flares since his last appointment, treated by his primary care provider. He reports that he had RSV in 2022, Influenza this past summer, and COVID-19 this past fall. He has had 5 COPD flare ups treated with antibiotics and steroids in the last year. He reports that he was recently treated for oral thrush with nystatin that has not fully cleared. He is gargling with salt water with hydrogen peroxide. He also reports that he has been having a \"wonky\" feeling in his head that feels like vertigo. He has evaluation with Dr. Francis Vargas with ENT on 23. He has been monitoring his SpO2 levels at home without desaturation below 95%. Overall patient reports respiratory symptoms have been worse since last appointment. Patient reports fair compliance with inhaled medications (Stiolto). He admits that sometimes he forgets to take the [employer]. He reports that he has not noticed a difference on days when he does not take it. Patient using albuterol 3-4 times per day on average. Patient reports physical limitation due to respiratory symptoms. His past medical history is significant for COPD, emphysema, hearing loss, hyperlipidemia, obesity, RLS, sleep apnea.      Complaints: shortness of breath   Onset Duration: over a year  Exacerbating factors: cold air, over exertion  Associated symptoms: cough with white phlegm, wheezing \"sometimes\" until he clears his chest, and chest tightness in cold weather  Pertinent negatives: hemoptysis  Risk factors for lung disease: tobacco exposure      Progress History:   Since last visit any new medical issues? Yes COPD flare ups  Using inhalers? Yes Stiolto and as needed albuterol inhaler and nebulizer  Are they helpful? No, Stiolto is not working as well  Any recent exacerbations? Yes  Last PFT: 1/31/23 - moderate COPD  Last 6 MWT: none in epic  Last A1AT: MM    Smoking History:  Quit smoking 1/10/22 with about 72 pack year history. However he has picked smoking back up on and off. His last cigarette was today. He has smoked up to 0.5 PPD. Social History:  Patient job history: He is currently working at a [employer] during daytime.             History of recreational or IV drug use in the past:NO  History of Alcohol use: No.  He quit drinking 15 years back   history of exposure to coal mines/coal dust: NO  History of exposure to foundry dust/welding: NO  History of exposure to quarry/silica/sandblasting: NO  History of exposure to asbestos/working with breaks/ships: NO  History of exposure to farm dust: NO  History of recent travel to long distances: NO  History of exposure to birds, pigeons, or chickens in the past:NO  Pet animals at home:No  He ever diagnosed with connective tissue diseases including Systemic lupus Erythematosus, Rheumatoid arthritis etc:NO  He denies any history of Glaucoma or urinary retention in the past    Flu vaccine: 10/24/22  Pneumonia vaccine: PPV23 1/5/22  COVID-19 vaccine: vaccinated  Past Medical hx   PMH:  Past Medical History:   Diagnosis Date    COPD (chronic obstructive pulmonary disease) ([facility]     Emphysema of lung ([facility]     Hearing loss     Hyperlipidemia     Obesity     Restless legs syndrome     Sleep apnea      SURGICAL HISTORY:  Past Surgical History:   Procedure Laterality Date    APPENDECTOMY      COLONOSCOPY      Dr Jesus Simon HISTORY:  Social History     Tobacco Use    Smoking status: Former     Packs/day: 2.00     Years: 36.00     Pack years: 72.00     Types: Cigarettes     Quit date: 2022     Years since quittin.0     Passive exposure: Yes    Smokeless tobacco: Never   Vaping Use    Vaping Use: Never used   Substance Use Topics    Alcohol use: Never    Drug use: No     ALLERGIES:No Known Allergies  FAMILY HISTORY:  Family History   Problem Relation Age of Onset    COPD Mother     Atrial Fibrillation Mother     Allergy (Severe) Mother         Penicillin, latex    Miscarriages / Stillbirths Mother     Obesity Mother     COPD Father     Mental Illness Father         Schizophrenic    Atrial Fibrillation Sister     Schizophrenia Brother     Lung Cancer Maternal Grandmother 80    Arthritis Maternal Grandmother     Cancer Maternal Grandmother     Alcohol Abuse Brother     Substance Abuse Brother     Colon Cancer Neg Hx     Colon Polyps Neg Hx      CURRENT MEDICATIONS:  Current Outpatient Medications   Medication Sig Dispense Refill    fluticasone-umeclidin-vilant (TRELEGY ELLIPTA) 100-62.5-25 MCG/ACT AEPB inhaler Inhale 1 puff into the lungs daily 28 each 11    nicotine (NICODERM CQ) 14 MG/24HR Place 1 patch onto the skin every 24 hours May wear 24 hours. May remove at bedtime if problems with insomnia. Re-Apply new patch immediately on awakening. 42 patch 0    nicotine (NICODERM CQ) 7 MG/24HR Place 1 patch onto the skin every 24 hours for 14 days Nicoderm 7mg/day patch need to be started only after completion of Nicoderm CQ 14mg/day patch for 14days ( 2weeks). May wear 24 hours. May remove at bedtime if problems with insomnia. Re-Apply new patch immediately on awakening.  14 patch 0    clotrimazole (MYCELEX) 10 MG dano Take 1 tablet by mouth 5 times daily for 10 days 50 tablet 0    Roflumilast (DALIRESP) 250 MCG tablet Take 1 tablet by mouth daily 28 tablet 0    Roflumilast (DALIRESP) 500 MCG tablet Take 1 tablet by mouth daily 30 tablet 11    albuterol (PROVENTIL) (2.5 MG/3ML) 0.083% nebulizer solution Take 3 mLs by nebulization every 6 hours as needed for Wheezing 120 each 0    albuterol sulfate HFA (PROVENTIL;VENTOLIN;PROAIR) 108 (90 Base) MCG/ACT inhaler Inhale 2 puffs into the lungs every 6 hours as needed for Wheezing 1 each 9    Cholecalciferol (VITAMIN D3) 50 MCG (2000 UT) CAPS Take 1 capsule by mouth daily 30 capsule 0    vitamin C (ASCORBIC ACID) 500 MG tablet Take 1 tablet by mouth daily 30 tablet 3    Naproxen Sodium (ALEVE PO) Take by mouth as needed      ZINC PO Take by mouth       No current facility-administered medications for this visit. Juan MILTON   Review of Systems   Constitutional:  Negative for appetite change, fever and unexpected weight change. HENT:  Positive for rhinorrhea. Negative for congestion, postnasal drip, sinus pressure, sinus pain and sneezing. Respiratory:  Positive for cough, chest tightness, shortness of breath and wheezing. Denies hemoptysis   Cardiovascular:  Negative for chest pain, palpitations and leg swelling. Genitourinary:  Negative for difficulty urinating. Allergic/Immunologic: Negative for environmental allergies. Neurological:  Positive for dizziness. Physical exam   /72   Pulse 64   Temp 97.9 °F (36.6 °C)   Ht 6' 2\" (1.88 m)   Wt 274 lb (124.3 kg)   SpO2 97%   BMI 35.18 kg/m²    Wt Readings from Last 3 Encounters:   02/01/23 274 lb (124.3 kg)   01/12/23 269 lb (122 kg)   12/07/22 250 lb (113.4 kg)       Physical Exam  Constitutional:       General: He is not in acute distress. Comments: BMI 35  Clothing smells strong of tobacco   HENT:      Head: Normocephalic and atraumatic.       Right Ear: External ear normal.      Left Ear: External ear normal.      Mouth/Throat: Mouth: Mucous membranes are moist.      Pharynx: Oropharyngeal exudate present. No posterior oropharyngeal erythema. Eyes:      General:         Right eye: No discharge. Left eye: No discharge. Cardiovascular:      Rate and Rhythm: Normal rate and regular rhythm. Pulmonary:      Effort: Pulmonary effort is normal. No respiratory distress. Breath sounds: No wheezing, rhonchi or rales. Comments: Diminished breath sounds  Chest:      Chest wall: No tenderness. Musculoskeletal:      Cervical back: Neck supple. Right lower leg: No edema. Left lower leg: No edema. Skin:     General: Skin is warm and dry. Neurological:      General: No focal deficit present. Mental Status: He is alert. Psychiatric:         Mood and Affect: Mood normal.         Behavior: Behavior normal.         Thought Content: Thought content normal.        Results   Lung Nodule Screening     [x] Qualifies    [] Does not qualify   [] Declined    [] Completed   The USPSTF recommends annual screening for lung cancer with low-dose computed tomography (LDCT) in adults aged 48 to [de-identified] years who have a 20 pack-year smoking history and currently smoke or have quit within the past 15 years. Screening should be discontinued once a person has not smoked for 15 years or develops a health problem that substantially limits life expectancy or the ability or willingness to have curative lung surgery. CT Chest 1/16/23  Narrative   NONCONTRAST SCREENING CT CHEST:       HISTORY: History of smoking. 72 pack year history of smoking. TECHNIQUE:    1 mm axial imaging of the chest and upper abdomen without IV contrast.    All CT scans at this facility use dose modulation, iterative reconstruction, and/or weight-based dosing when appropriate to reduce radiation dose to as low as reasonably achievable.        All CT scans at this facility use dose modulation, iterative reconstruction, and/or weight based dosing when appropriate to reduce the radiation dose to as low as reasonably achievable. COMPARISON: 1/12/2022           FINDINGS:   LUNGS NODULES:   1. There is a 6 x 3 mm pulmonary nodule within the anterior right midlung which is unchanged from prior examination. 2. There is an 8 x 5 mm nodule within the anterior right lower lobe which is unchanged from prior examination. 3. There is a 6 x 4 mm pulmonary nodule within the right lower lobe laterally which is unchanged from prior examination. 4. There is a 3 mm pulmonary nodule at the lateral left lung base which is unchanged from prior examination. LYMPHADENOPATHY:   1. There are no pathologically enlarged lymph nodes. OTHER (LUNGS/MEDIASTINUM/MUSCULOSKELETAL/ABDOMEN):   1. There is a left adrenal nodule which appears similar compared to prior examination. This measures 2 cm in transverse dimension. There is a low-density lesion within the caudate lobe of the liver on axial image 48 which is incompletely visualized    measuring 1.2 cm in diameter. Impression   1. There are no suspicious masses or nodules within the lung fields. 2. There are no pathologically enlarged lymph nodes. 3. LUNGRADS ASSESSMENT VALUE: 2,           The LUNG RADS RECOMMENDATIONS for monitoring lung nodules listed below (ACR- Lung-RADS Version 1.0 Assessment Categories)       LUNG RADS RECOMMENDATIONS;   1.  Normal, continue annual screening   2. Benign appearance or behavior, continue annual screening   3.  6 month CT recommended   4A.  3 month CT recommended; may consider PET/CT   4B. Additional diagnostics and/or tissue sampling recommended   4X. Additional diagnostics and/or tissue sampling recommended         **This report has been created using voice recognition software. It may contain minor errors which are inherent in voice recognition technology. **       Final report electronically signed by Dr. Vijay Rossi on 1/16/2023 3:01 PM Component Ref Range & Units 1/11/22 1322    pH, Blood Gas 7.35 - 7.45 7.43    PCO2 35 - 45 mmhg 35    PO2 71 - 104 mmhg 81    HCO3 23 - 28 mmol/l 23    Base Excess (Calculated) -2.5 - 2.5 mmol/l -0.6    O2 Sat % 96    DEVICE  Room Air    COLLECTED BY:  [id number]       Latest Reference Range & Units 7/14/22 08:42   Albumin 3.5 - 5.1 g/dL 4.4   Alk Phos 38 - 126 U/L 82   ALT 11 - 66 U/L 39   AST 5 - 40 U/L 27   Bilirubin 0.3 - 1.2 mg/dL 0.4   GGT 8 - 69 U/L 56   Total Protein 6.1 - 8.0 g/dL 6.4     Assessment      Diagnosis Orders   1. Moderate COPD (chronic obstructive pulmonary disease) (HCC)  fluticasone-umeclidin-vilant (TRELEGY ELLIPTA) 100-62.5-25 MCG/ACT AEPB inhaler    nicotine (NICODERM CQ) 14 MG/24HR    nicotine (NICODERM CQ) 7 MG/24HR    Blood Gas, Arterial    Roflumilast (DALIRESP) 250 MCG tablet    Roflumilast (DALIRESP) 500 MCG tablet    Hepatic Function Panel      2. COPD, frequent exacerbations (HCC)  Roflumilast (DALIRESP) 250 MCG tablet    Roflumilast (DALIRESP) 500 MCG tablet      3. Current smoker on some days  nicotine (NICODERM CQ) 14 MG/24HR    nicotine (NICODERM CQ) 7 MG/24HR    Roflumilast (DALIRESP) 250 MCG tablet    Roflumilast (DALIRESP) 500 MCG tablet      4. Oral thrush  clotrimazole (MYCELEX) 10 MG dano            Plan   1. Moderate COPD (chronic obstructive pulmonary disease) frequent exacerbations   -Patient has had 5 exacerbations in the last year. Stop Stiolto and escalate therapy to Trelegy. Instructed patient on use. Rinse and spit after use. Patient was given 1 sample  -Obtain Blood Gas, Arterial to evaluate \"wonky\" feeling in head with recent exacerbations  -Start Roflumilast (DALIRESP). Instructed patient to start 250 mcg daily x 28 days then escalate to 500 mcg daily.  Advised on possible side effects and gave patient hand out on daliresp.  -Obtain Hepatic Function Panel prior to starting daliresp  -Reviewed stable spirometry testing with patient and significant other  -Reviewed preventative vaccinations    2. Current smoker on some days  -Strongly advised full smoking cessation. Discussed with patient smoking cessation techniques including patches and gum patient reports has used in the past and did not help, reinforced to patient the importance of quitting smoking to prevent further damage to lung function, patient verbalized understanding. (Approximately 3 mins)   -Start nicotine patches 14 mg daily x 6 weeks then 7 mg daily for 2 weeks. Patient was instructed on use and not to smoke while using nicotine patches  Advised patient to quit and offered support. Educational material provided to patient. Quit date chosen by patient as 3 months from now. Reviewed strategies to maximize success. -Stable lung screening, will plan to repeat in 1 year. This will need ordered at patient's next office visit. 3. Oral thrush  -Start clotrimazole (MYCELEX) 10 MG dano; Take 1 tablet by mouth 5 times daily for 10 days. Advised patient to call if his thrush does not resolve    Advised patient to call office with any changes, questions, or concerns regarding respiratory status or issues with prescribed medications    Return in about 3 months (around 5/1/2023) for COPD.        Electronically signed by YARI Justice CNP on 2/1/2023 at 3:24 PM     (Please note that portions of this note may have been completed with a voice recognition program. Efforts were made to edit the dictation but occasionally words are mis-transcribed)

## 2023-02-01 ENCOUNTER — OFFICE VISIT (OUTPATIENT)
Dept: PULMONOLOGY | Age: 53
End: 2023-02-01
Payer: COMMERCIAL

## 2023-02-01 VITALS
TEMPERATURE: 97.9 F | SYSTOLIC BLOOD PRESSURE: 136 MMHG | DIASTOLIC BLOOD PRESSURE: 72 MMHG | HEART RATE: 64 BPM | BODY MASS INDEX: 35.16 KG/M2 | HEIGHT: 74 IN | OXYGEN SATURATION: 97 % | WEIGHT: 274 LBS

## 2023-02-01 DIAGNOSIS — J44.9 MODERATE COPD (CHRONIC OBSTRUCTIVE PULMONARY DISEASE) (HCC): Primary | ICD-10-CM

## 2023-02-01 DIAGNOSIS — J44.1 COPD, FREQUENT EXACERBATIONS (HCC): ICD-10-CM

## 2023-02-01 DIAGNOSIS — F17.200 CURRENT SMOKER ON SOME DAYS: ICD-10-CM

## 2023-02-01 DIAGNOSIS — B37.0 ORAL THRUSH: ICD-10-CM

## 2023-02-01 PROCEDURE — 99406 BEHAV CHNG SMOKING 3-10 MIN: CPT

## 2023-02-01 PROCEDURE — 99214 OFFICE O/P EST MOD 30 MIN: CPT

## 2023-02-01 RX ORDER — CLOTRIMAZOLE 10 MG/1
10 LOZENGE ORAL; TOPICAL
Qty: 50 TABLET | Refills: 0 | Status: SHIPPED | OUTPATIENT
Start: 2023-02-01 | End: 2023-02-11

## 2023-02-01 RX ORDER — ROFLUMILAST 500 UG/1
500 TABLET ORAL DAILY
Qty: 30 TABLET | Refills: 11 | Status: SHIPPED | OUTPATIENT
Start: 2023-02-01

## 2023-02-01 RX ORDER — NICOTINE 21 MG/24HR
1 PATCH, TRANSDERMAL 24 HOURS TRANSDERMAL EVERY 24 HOURS
Qty: 42 PATCH | Refills: 0 | Status: SHIPPED | OUTPATIENT
Start: 2023-02-01 | End: 2023-03-15

## 2023-02-01 RX ORDER — ROFLUMILAST 250 UG/1
250 TABLET ORAL DAILY
Qty: 28 TABLET | Refills: 0 | Status: SHIPPED | OUTPATIENT
Start: 2023-02-01

## 2023-02-01 RX ORDER — FLUTICASONE FUROATE, UMECLIDINIUM BROMIDE AND VILANTEROL TRIFENATATE 100; 62.5; 25 UG/1; UG/1; UG/1
1 POWDER RESPIRATORY (INHALATION) DAILY
Qty: 28 EACH | Refills: 11 | Status: SHIPPED | OUTPATIENT
Start: 2023-02-01

## 2023-02-01 ASSESSMENT — ENCOUNTER SYMPTOMS
SINUS PAIN: 0
CHEST TIGHTNESS: 1
RHINORRHEA: 1
SHORTNESS OF BREATH: 1
SINUS PRESSURE: 0
WHEEZING: 1
COUGH: 1

## 2023-02-01 NOTE — PATIENT INSTRUCTIONS
Stop Stiolto. Start Trelegy. This will be 1 puff daily. Rinse mouth with water and spit after use. You may use salt water rinses to gargle do not use hydrogen peroxide. Continue your albuterol inhaler and nebulizer. You may use one or the other every 6 hours as needed for shortness of breath or wheezing. Do NOT use both at the same time as they continue the same medication. Start nicotine patches 14 mg dose initially for 6 weeks then go down to the 7 mg dose. Start mycelex troches 5 times daily for 10 days. Start Daliresp. You will start on 250 mcg daily dose for 28 days then you will increase to the 500 mcg dose. Please make sure to get your liver function labwork before starting Daliresp. Make sure to get your arterial blood gas first thing in the morning after waking up. I will see you back in 3 months. COPD Exacerbation Plan: Care Instructions  Your Care Instructions     If you have chronic obstructive pulmonary disease (COPD), your usual shortness of breath could suddenly get worse. You may start coughing more and have more mucus. This flare-up is called a COPD exacerbation (say \"od-STN-on-[facility]\"). A lung infection or air pollution could set off an exacerbation. Sometimes it can happen after a quick change in temperature or being around chemicals. Work with your doctor to make a plan for dealing with an exacerbation. You can better manage it if you plan ahead. Follow-up care is a key part of your treatment and safety. Be sure to make and go to all appointments, and call your doctor if you are having problems. It's also a good idea to know your test results and keep a list of the medicines you take. How can you care for yourself at home? During an exacerbation  Do not panic if you start to have one. Quick treatment at home may help you prevent serious breathing problems. If you have a COPD exacerbation plan that you developed with your doctor, follow it.   Take your medicines exactly as your doctor tells you. Use your inhaler as directed by your doctor. If your symptoms do not get better after you use your medicine, have someone take you to the emergency room. Call an ambulance if necessary. With inhaled medicines, a spacer or a nebulizer may help you get more medicine to your lungs. Ask your doctor or pharmacist how to use them properly. Practice using the spacer in front of a mirror before you have an exacerbation. This may help you get the medicine into your lungs quickly. If your doctor has given you steroid pills, take them as directed. Your doctor may have given you a prescription for antibiotics, which you can fill if you need it. Talk to your doctor if you have any problems with your medicine. And call your doctor if you have to use your antibiotic or steroid pills. Preventing an exacerbation  Do not smoke. This is the most important step you can take to prevent more damage to your lungs and prevent problems. If you already smoke, it is never too late to stop. If you need help quitting, talk to your doctor about stop-smoking programs and medicines. These can increase your chances of quitting for good. Take your daily medicines as prescribed. Avoid colds and flu. Get a pneumococcal vaccine. Get a flu vaccine each year, as soon as it is available. Ask those you live or work with to do the same, so they will not get the flu and infect you. Try to stay away from people with colds or the flu. Wash your hands often. Avoid secondhand smoke; air pollution; cold, dry air; hot, humid air; and high altitudes. Stay at home with your windows closed when air pollution is bad. Learn breathing techniques for COPD, such as breathing through pursed lips. These techniques can help you breathe easier during an exacerbation. When should you call for help? Call 911 anytime you think you may need emergency care. For example, call if:    You have severe trouble breathing. You have severe chest pain. Call your doctor now or seek immediate medical care if:    You have new or worse shortness of breath. You develop new chest pain. You are coughing more deeply or more often, especially if you notice more mucus or a change in the color of your mucus. You cough up blood. You have new or increased swelling in your legs or belly. You have a fever. Watch closely for changes in your health, and be sure to contact your doctor if:    You need to use your antibiotic or steroid pills. Your symptoms are getting worse. Where can you learn more? Go to https://Zyantepenieshaeb.[employer]intu [employer]y. org and sign in to your [employer] account. Enter I953 in the [employer] box to learn more about \"COPD Exacerbation Plan: Care Instructions. \"     If you do not have an account, please click on the \"Sign Up Now\" link. Current as of: October 26, 2020               Content Version: 12.9  © 2006-2021 [employer]. Care instructions adapted under license by [address] [place] [address]. If you have questions about a medical condition or this instruction, always ask your healthcare professional. [place] [address] any warranty or liability for your use of this information. Learning About Benefits From Quitting Smoking  How does quitting smoking make you healthier? If you're thinking about quitting smoking, you may have a few reasons to be smoke-free. Your health may be one of them. When you quit smoking, you lower your risks for cancer, lung disease, heart attack, stroke, blood vessel disease, and blindness from macular degeneration. When you're smoke-free, you get sick less often, and you heal faster. You are less likely to get colds, flu, bronchitis, and pneumonia. As a nonsmoker, you may find that your mood is better and you are less stressed. When and how will you feel healthier?   Quitting has real health benefits that start from day 1 of being smoke-free. And the longer you stay smoke-free, the healthier you get and the better you feel. The first hours  After just 20 minutes, your blood pressure and heart rate go down. That means there's less stress on your heart and blood vessels. Within 12 hours, the level of carbon monoxide in your blood drops back to normal. That makes room for more oxygen. With more oxygen in your body, you may notice that you have more energy than when you smoked. After 2 weeks  Your lungs start to work better. Your risk of heart attack starts to drop. After 1 month  When your lungs are clear, you cough less and breathe deeper, so it's easier to be active. Your sense of taste and smell return. That means you can enjoy food more than you have since you started smoking. Over the years  Over the years, your risks of heart disease, heart attack, and stroke are lower. After 10 years, your risk of dying from lung cancer is cut by about half. And your risk for many other types of cancer is lower too. How would quitting help others in your life? When you quit smoking, you improve the health of everyone who now breathes in your smoke. Their heart, lung, and cancer risks drop, much like yours. They are sick less. For babies and small children, living smoke-free means they're less likely to have ear infections, pneumonia, and bronchitis. If you're a woman who is or will be pregnant someday, quitting smoking means a healthier . Children who are close to you are less likely to become adult smokers. Where can you learn more? Go to https://brigida.Trendabl. org and sign in to your [employer] account. Enter [id number] [id number] [phone] in the [facility] box to learn more about \"Learning About Benefits From Quitting Smoking. \"     If you do not have an account, please click on the \"Sign Up Now\" link. Current as of: 2021               Content Version: 12.9  © 0976-7619 Healthwise[employer] Incorporated.    Care instructions adapted under license by [facility] ([facility]). If you have questions about a medical condition or this instruction, always ask your healthcare professional. [address] any warranty or liability for your use of this information.

## 2023-02-07 ENCOUNTER — TELEPHONE (OUTPATIENT)
Dept: PULMONOLOGY | Age: 53
End: 2023-02-07

## 2023-02-07 ENCOUNTER — CLINICAL DOCUMENTATION (OUTPATIENT)
Dept: PULMONOLOGY | Age: 53
End: 2023-02-07

## 2023-02-07 ENCOUNTER — HOSPITAL ENCOUNTER (OUTPATIENT)
Dept: RESPIRATORY THERAPY | Age: 53
Discharge: HOME | End: 2023-02-07
Payer: COMMERCIAL

## 2023-02-07 DIAGNOSIS — J44.9 MODERATE COPD (CHRONIC OBSTRUCTIVE PULMONARY DISEASE) (HCC): ICD-10-CM

## 2023-02-07 LAB
ARTERIAL PATENCY WRIST A: POSITIVE
BASE EXCESS BLDA CALC-SCNC: -0.9 MMOL/L (ref -2.5–2.5)
BASE EXCESS BLDA CALC-SCNC: -1.6 MMOL/L (ref -2–3)
BDY SITE: ABNORMAL
COLLECTED BY:: ABNORMAL
COLLECTED BY:: ABNORMAL
DEVICE: ABNORMAL
DEVICE: ABNORMAL
FIO2 ON VENT O2 ANALYZER: 21 %
FIO2 ON VENT O2 ANALYZER: 21 %
HCO3 BLDA-SCNC: 20 MMOL/L (ref 23–28)
HCO3 BLDA-SCNC: 22 MMOL/L (ref 23–28)
PCO2 BLDA: 26 MMHG (ref 35–45)
PCO2 TEMP ADJ BLDMV: 35 MMHG (ref 41–51)
PH BLDA: 7.5 [PH] (ref 7.35–7.45)
PH BLDMV: 7.41 [PH] (ref 7.31–7.41)
PO2 BLDA: 94 MMHG (ref 71–104)
PO2 BLDMV: 52 MMHG (ref 25–40)
SAO2 % BLDA: 98 %
SAO2 % BLDMV: 87 %
SITE: ABNORMAL

## 2023-02-07 PROCEDURE — 82803 BLOOD GASES ANY COMBINATION: CPT

## 2023-02-07 PROCEDURE — 36600 WITHDRAWAL OF ARTERIAL BLOOD: CPT

## 2023-02-07 NOTE — PROGRESS NOTES
After drawing initial ABG with a PaO2 of 52 and SpO2 of 87% I checked pulse oximeter and SpO2 was 97%. Pt asked to have abg redrawn in case results had been inaccurate or were possibly mixed with venous as I had to redirect needle after getting a flash initially, before going to ER per request of Braldy Aguirre CNP. ABG redrawn after patient was deep breathing and had removed his mask, and results were PaO2 93.9 and SaO2 98%. Called and left message for Tiffany to call me back to discuss new results.

## 2023-02-07 NOTE — PROGRESS NOTES
Received notification from [facility], respiratory therapist at [facility], that patient's ABG revealed a PO2 52 and O2 sat 87. At patient's appointment on 2/1/23 his SpO2 was 97%. With this significant decline in oxygen saturation, I advised patient be evaluated in the ED.

## 2023-02-07 NOTE — TELEPHONE ENCOUNTER
Please bring patient in for oxygen evaluation in office. If I do not have any available appointments, can we see if Riley Meigs, APRN - CNP would be able to see patient? Thanks!

## 2023-02-07 NOTE — PROGRESS NOTES
Received second notification from [place], RT at [facility] that the patient had asked for her to check a pulse ox and redraw ABG before going to ED. He stated to Chastidy that it was his wife's birthday and they were having a surprise party for her tonight and he did not want to miss it. His Spo2 on pulse ox was 97%. He then removed his mask and took multiple deep breaths before redrawing the ABG. His new results revealed normal PO2 and O2 Sat. He did show respiratory alkalosis, which may be due to patient taking several deep breaths prior to re-draw. Will plan to bring patient in for an earlier appointment for oxygen evaluation.

## 2023-02-07 NOTE — TELEPHONE ENCOUNTER
Berenice Coleman from [place] [address] respiratory called back after you spoke with her. She needs you to give her a call back regarding this patient. Call her back at [phone].

## 2023-02-09 ENCOUNTER — OFFICE VISIT (OUTPATIENT)
Dept: PULMONOLOGY | Age: 53
End: 2023-02-09

## 2023-02-09 ENCOUNTER — TELEPHONE (OUTPATIENT)
Dept: PULMONOLOGY | Age: 53
End: 2023-02-09

## 2023-02-09 VITALS
BODY MASS INDEX: 35.01 KG/M2 | HEIGHT: 74 IN | OXYGEN SATURATION: 96 % | SYSTOLIC BLOOD PRESSURE: 132 MMHG | DIASTOLIC BLOOD PRESSURE: 80 MMHG | TEMPERATURE: 98.6 F | HEART RATE: 80 BPM | WEIGHT: 272.8 LBS

## 2023-02-09 DIAGNOSIS — Z87.891 PERSONAL HISTORY OF TOBACCO USE: ICD-10-CM

## 2023-02-09 DIAGNOSIS — J44.9 MODERATE COPD (CHRONIC OBSTRUCTIVE PULMONARY DISEASE) (HCC): Primary | ICD-10-CM

## 2023-02-09 DIAGNOSIS — F17.200 CURRENT SMOKER ON SOME DAYS: Primary | ICD-10-CM

## 2023-02-09 RX ORDER — NICOTINE 21 MG/24HR
1 PATCH, TRANSDERMAL 24 HOURS TRANSDERMAL DAILY
Qty: 42 PATCH | Refills: 0 | Status: SHIPPED | OUTPATIENT
Start: 2023-02-09 | End: 2023-03-23

## 2023-02-09 ASSESSMENT — ENCOUNTER SYMPTOMS
COUGH: 1
NAUSEA: 0
SPUTUM PRODUCTION: 1
CHEST TIGHTNESS: 0
HEMOPTYSIS: 0
DIARRHEA: 0
WHEEZING: 1
SHORTNESS OF BREATH: 1
STRIDOR: 0
VOMITING: 0

## 2023-02-09 ASSESSMENT — COPD QUESTIONNAIRES: COPD: 1

## 2023-02-09 NOTE — TELEPHONE ENCOUNTER
Pt was here in office today with pérez. He let me know the pharmacy told him they could not fill the patch prescription because there was no signature. Called and verified with the pharmacy, they said the only script they have is for the Nicoderm 14 and it is needing a signature.
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
## 2023-02-09 NOTE — PROGRESS NOTES
Patient is experiencing SOB: no    Patient is experiencing wheezing: No    Patient states they have had a Weak, productive = 2 cough. Phlegm is clear/white daily     Patient is coughing up blood: no    Patient has been experiencing chest pains: non-existent    Patient is currently taking the following inhaler(s): trelegy & albuterol    Patient is currently taking the following nebulizer treatment(s): albuterol     Patient is using their rescue inhaler 1 times per day . Patient is currently using 0 liters of oxygen during n/a. Patient needs refills of the following medications: patches werent filled pharmacy said needs signed. All refills should be sent to [facility] in [place]    Other: abg results. . concerned about co2

## 2023-02-21 ENCOUNTER — TELEPHONE (OUTPATIENT)
Dept: UROLOGY | Age: 53
End: 2023-02-21

## 2023-02-21 NOTE — TELEPHONE ENCOUNTER
Patient scheduled for MRI ABD W WO  at [facility] on 6/6/23 ARRIVAL OF 9AM FOR A 930AM SCAN. NPO 4 HOURS PRIOR.     Order mailed with instructions  to the patient

## 2023-03-06 ENCOUNTER — TELEPHONE (OUTPATIENT)
Dept: PULMONOLOGY | Age: 53
End: 2023-03-06

## 2023-03-06 NOTE — TELEPHONE ENCOUNTER
Tried calling patient to see if he was able to get Daliresp 500 and id so has he started on it or is he still waiting in the Daliresp 250.  Patient's voicemail is full

## 2023-03-29 ENCOUNTER — HOSPITAL ENCOUNTER (OUTPATIENT)
Age: 53
Discharge: HOME | End: 2023-03-29
Payer: COMMERCIAL

## 2023-03-29 DIAGNOSIS — J44.9 MODERATE COPD (CHRONIC OBSTRUCTIVE PULMONARY DISEASE) (HCC): ICD-10-CM

## 2023-03-29 LAB
ALBUMIN SERPL BCG-MCNC: 4.3 G/DL (ref 3.5–5.1)
ALP SERPL-CCNC: 96 U/L (ref 38–126)
ALT SERPL W/O P-5'-P-CCNC: 44 U/L (ref 11–66)
AST SERPL-CCNC: 22 U/L (ref 5–40)
BILIRUB CONJ SERPL-MCNC: < 0.2 MG/DL (ref 0–0.3)
BILIRUB SERPL-MCNC: 0.3 MG/DL (ref 0.3–1.2)
PROT SERPL-MCNC: 6.7 G/DL (ref 6.1–8)

## 2023-03-29 PROCEDURE — 36415 COLL VENOUS BLD VENIPUNCTURE: CPT

## 2023-03-29 PROCEDURE — 80076 HEPATIC FUNCTION PANEL: CPT

## 2023-04-06 DIAGNOSIS — R06.02 SOB (SHORTNESS OF BREATH): ICD-10-CM

## 2023-04-07 RX ORDER — ALBUTEROL SULFATE 2.5 MG/3ML
2.5 SOLUTION RESPIRATORY (INHALATION) EVERY 6 HOURS PRN
Qty: 120 EACH | Refills: 0 | OUTPATIENT
Start: 2023-04-07

## 2023-04-07 RX ORDER — ALBUTEROL SULFATE 2.5 MG/3ML
SOLUTION RESPIRATORY (INHALATION)
Qty: 360 ML | Refills: 0 | Status: SHIPPED | OUTPATIENT
Start: 2023-04-07

## 2023-04-07 NOTE — TELEPHONE ENCOUNTER
Maddy Wyatt called requesting a refill on the following medications:  Requested Prescriptions     Pending Prescriptions Disp Refills    albuterol (PROVENTIL) (2.5 MG/3ML) 0.083% nebulizer solution [Pharmacy Med Name: ALBUTEROL SUL 2.5 MG/3 ML SOLN] 360 mL 0     Sig: inhale contents of 1 vial in nebulizer by mouth every 6 hours if needed for wheezing       Date of last visit: 1/12/2023  Date of next visit (if applicable):4/6/2023  Date of last refill: 11/28/2022  Pharmacy Name: [facility],  [place], [place]

## 2023-06-01 ENCOUNTER — TELEPHONE (OUTPATIENT)
Dept: FAMILY MEDICINE CLINIC | Age: 53
End: 2023-06-01

## 2023-06-01 NOTE — TELEPHONE ENCOUNTER
----- Message from Yaa Masters sent at 5/30/2023 10:57 AM EDT -----  Subject: Message to Provider    QUESTIONS  Information for Provider? Patient called would like to drop off FMLA forms   at office some time today, patient stated his last visit was 1/12/2023,   would like if office staff could call back  ---------------------------------------------------------------------------  --------------  [address] [facility]  [phone]; OK to leave message on voicemail  ---------------------------------------------------------------------------  --------------  SCRIPT ANSWERS  Relationship to Patient?  Self

## 2023-06-06 ENCOUNTER — HOSPITAL ENCOUNTER (OUTPATIENT)
Dept: MRI IMAGING | Age: 53
Discharge: HOME | End: 2023-06-06
Payer: COMMERCIAL

## 2023-06-06 DIAGNOSIS — N28.89 RENAL MASS, LEFT: ICD-10-CM

## 2023-06-06 LAB — POC CREATININE WHOLE BLOOD: 0.8 MG/DL (ref 0.5–1.2)

## 2023-06-06 PROCEDURE — A9579 GAD-BASE MR CONTRAST NOS,1ML: HCPCS | Performed by: UROLOGY

## 2023-06-06 PROCEDURE — 6360000004 HC RX CONTRAST MEDICATION: Performed by: UROLOGY

## 2023-06-06 PROCEDURE — 82565 ASSAY OF CREATININE: CPT

## 2023-06-06 PROCEDURE — 74183 MRI ABD W/O CNTR FLWD CNTR: CPT

## 2023-06-06 RX ADMIN — GADOTERIDOL 20 ML: 279.3 INJECTION, SOLUTION INTRAVENOUS at 10:42

## 2023-06-07 DIAGNOSIS — R06.02 SOB (SHORTNESS OF BREATH): ICD-10-CM

## 2023-06-08 RX ORDER — ALBUTEROL SULFATE 2.5 MG/3ML
2.5 SOLUTION RESPIRATORY (INHALATION) EVERY 6 HOURS PRN
Qty: 360 ML | Refills: 1 | Status: SHIPPED | OUTPATIENT
Start: 2023-06-08

## 2023-06-08 NOTE — TELEPHONE ENCOUNTER
Maddy Wyatt called requesting a refill on the following medications:  Requested Prescriptions     Pending Prescriptions Disp Refills    albuterol (PROVENTIL) (2.5 MG/3ML) 0.083% nebulizer solution 360 mL 1     Sig: Take 3 mLs by nebulization every 6 hours as needed for Wheezing       Date of last visit: 1/12/2023  Date of next visit (if applicable):Visit date not found  Date of last refill: 04/07/23  Pharmacy Name: [facility],  [place], [place]

## 2023-06-26 ENCOUNTER — TELEPHONE (OUTPATIENT)
Dept: UROLOGY | Age: 53
End: 2023-06-26

## 2023-07-17 ENCOUNTER — E-VISIT (OUTPATIENT)
Dept: FAMILY MEDICINE CLINIC | Age: 53
End: 2023-07-17
Payer: COMMERCIAL

## 2023-07-17 DIAGNOSIS — J44.1 COPD EXACERBATION (HCC): Primary | ICD-10-CM

## 2023-07-17 PROCEDURE — 99421 OL DIG E/M SVC 5-10 MIN: CPT | Performed by: FAMILY MEDICINE

## 2023-07-17 RX ORDER — AZITHROMYCIN 250 MG/1
250 TABLET, FILM COATED ORAL SEE ADMIN INSTRUCTIONS
Qty: 6 TABLET | Refills: 0 | Status: SHIPPED | OUTPATIENT
Start: 2023-07-17 | End: 2023-07-22

## 2023-07-17 RX ORDER — PREDNISONE 20 MG/1
20 TABLET ORAL 2 TIMES DAILY
Qty: 10 TABLET | Refills: 0 | Status: SHIPPED | OUTPATIENT
Start: 2023-07-17 | End: 2023-07-22

## 2023-07-17 ASSESSMENT — LIFESTYLE VARIABLES
SMOKING_STATUS: NO, BUT I USED TO SMOKE
SMOKING_YEARS: 35
PACKS_PER_DAY: 1.5

## 2023-07-18 NOTE — PROGRESS NOTES
HPI: as per patient provided history  Exam: N/A (electronic visit)  ASSESSMENT/PLAN:  1. COPD exacerbation (HCC)  Acute on chronic symptoms. Had prednisone on 7/10. Treat with another round of prednisone and also antibiotic. - predniSONE (DELTASONE) 20 MG tablet; Take 1 tablet by mouth 2 times daily for 5 days  Dispense: 10 tablet; Refill: 0  - azithromycin (ZITHROMAX) 250 MG tablet; Take 1 tablet by mouth See Admin Instructions for 5 days 500mg on day 1 followed by 250mg on days 2 - 5  Dispense: 6 tablet; Refill: 0      Patient instructed to call the office if worsens, or fails to improve as anticipated. 5-10 minutes were spent on the digital evaluation and management of this patient.

## 2023-07-25 ENCOUNTER — OFFICE VISIT (OUTPATIENT)
Dept: FAMILY MEDICINE CLINIC | Age: 53
End: 2023-07-25
Payer: COMMERCIAL

## 2023-07-25 VITALS
SYSTOLIC BLOOD PRESSURE: 132 MMHG | RESPIRATION RATE: 18 BRPM | TEMPERATURE: 98.8 F | HEART RATE: 77 BPM | HEIGHT: 74 IN | DIASTOLIC BLOOD PRESSURE: 84 MMHG | BODY MASS INDEX: 33.93 KG/M2 | WEIGHT: 264.4 LBS | OXYGEN SATURATION: 97 %

## 2023-07-25 DIAGNOSIS — R06.02 SOB (SHORTNESS OF BREATH) ON EXERTION: ICD-10-CM

## 2023-07-25 DIAGNOSIS — J44.9 MODERATE COPD (CHRONIC OBSTRUCTIVE PULMONARY DISEASE) (HCC): Primary | ICD-10-CM

## 2023-07-25 DIAGNOSIS — R07.89 CHEST TIGHTNESS: ICD-10-CM

## 2023-07-25 PROCEDURE — 93000 ELECTROCARDIOGRAM COMPLETE: CPT | Performed by: FAMILY MEDICINE

## 2023-07-25 PROCEDURE — 99213 OFFICE O/P EST LOW 20 MIN: CPT | Performed by: FAMILY MEDICINE

## 2023-07-25 SDOH — ECONOMIC STABILITY: FOOD INSECURITY: WITHIN THE PAST 12 MONTHS, YOU WORRIED THAT YOUR FOOD WOULD RUN OUT BEFORE YOU GOT MONEY TO BUY MORE.: NEVER TRUE

## 2023-07-25 SDOH — ECONOMIC STABILITY: INCOME INSECURITY: HOW HARD IS IT FOR YOU TO PAY FOR THE VERY BASICS LIKE FOOD, HOUSING, MEDICAL CARE, AND HEATING?: NOT HARD AT ALL

## 2023-07-25 SDOH — ECONOMIC STABILITY: HOUSING INSECURITY
IN THE LAST 12 MONTHS, WAS THERE A TIME WHEN YOU DID NOT HAVE A STEADY PLACE TO SLEEP OR SLEPT IN A SHELTER (INCLUDING NOW)?: NO

## 2023-07-25 SDOH — ECONOMIC STABILITY: FOOD INSECURITY: WITHIN THE PAST 12 MONTHS, THE FOOD YOU BOUGHT JUST DIDN'T LAST AND YOU DIDN'T HAVE MONEY TO GET MORE.: NEVER TRUE

## 2023-07-25 ASSESSMENT — ENCOUNTER SYMPTOMS
CHEST TIGHTNESS: 1
SHORTNESS OF BREATH: 1
WHEEZING: 0
COUGH: 0
DIFFICULTY BREATHING: 1

## 2023-07-25 ASSESSMENT — COPD QUESTIONNAIRES: COPD: 1

## 2023-07-25 ASSESSMENT — PATIENT HEALTH QUESTIONNAIRE - PHQ9
SUM OF ALL RESPONSES TO PHQ QUESTIONS 1-9: 0
1. LITTLE INTEREST OR PLEASURE IN DOING THINGS: 0
SUM OF ALL RESPONSES TO PHQ9 QUESTIONS 1 & 2: 0
2. FEELING DOWN, DEPRESSED OR HOPELESS: 0

## 2023-07-25 NOTE — PROGRESS NOTES
[facility]  [facility]  [address] [facility]  Dept: [phone]  Dept Fax: [phone]  Loc: [phone]  PROGRESS NOTE      Visit Date: 7/25/2023    Layne Schwartz is a 46 y.o. male who presents today for:  Chief Complaint   Patient presents with    Breathing Problem     Patient states he is very restless at night. COPD       Subjective:  Breathing Problem  He complains of difficulty breathing and shortness of breath. There is no cough or wheezing. Pertinent negatives include no fever. COPD  He complains of difficulty breathing and shortness of breath. There is no cough or wheezing. Pertinent negatives include no fever. COPD:  s/p prednisone burst x2 in July and azithro on 7/17. Overall, feeling better. Quit smoking. Mimi Home was denied by insurance. Has had more lung irritation with stone dust at work and then smoke from [place]. On room air. On trelegy ellipta. Coughing is improving/resolved. Not wheezing. Albuterol 4x per day (baseline is 2x per day). PFTs in jan 2023. Follows with pulm    Chest tightness with cleaning house yesterday that was present the rest of the day. Chest tightness has improved since treatment with steroids. No SOB with stairs or walking. SOB with lifting weight. No previous stress test.  No previous heart cath    He needs to make appt with eye provider. Review of Systems   Constitutional:  Negative for chills and fever. Respiratory:  Positive for chest tightness and shortness of breath. Negative for cough and wheezing.     Patient Active Problem List   Diagnosis    Obesity (BMI 30.0-34.9)    Other hyperlipidemia    Elevated hemoglobin (HCC)    Current smoker on some days    Anxiety    Abnormal PFTs    Moderate COPD (chronic obstructive pulmonary disease) (HCC)    Oral thrush     Past Medical History:   Diagnosis Date    COPD (chronic obstructive pulmonary disease) ([facility])

## 2023-07-27 ENCOUNTER — TELEPHONE (OUTPATIENT)
Dept: FAMILY MEDICINE CLINIC | Age: 53
End: 2023-07-27

## 2023-07-27 DIAGNOSIS — R06.02 SOB (SHORTNESS OF BREATH) ON EXERTION: Primary | ICD-10-CM

## 2023-07-27 DIAGNOSIS — R07.89 CHEST TIGHTNESS: ICD-10-CM

## 2023-07-27 NOTE — TELEPHONE ENCOUNTER
Patient called and would like to know if he could do the Jo stress test instead of the exercise one. Patient feels he will no be able to do the exercise one due to his COPD.

## 2023-07-27 NOTE — TELEPHONE ENCOUNTER
Stress test order changed to stress test with Pharm. Cancel exercise stress test.  Please advise patient.   Eun Silver MD

## 2023-07-31 DIAGNOSIS — R06.02 SOB (SHORTNESS OF BREATH): ICD-10-CM

## 2023-08-01 RX ORDER — ALBUTEROL SULFATE 2.5 MG/3ML
2.5 SOLUTION RESPIRATORY (INHALATION) EVERY 6 HOURS PRN
Qty: 360 ML | Refills: 1 | Status: SHIPPED | OUTPATIENT
Start: 2023-08-01

## 2023-08-01 NOTE — TELEPHONE ENCOUNTER
Brenda Whitley called requesting a refill on the following medications:  Requested Prescriptions     Pending Prescriptions Disp Refills    albuterol (PROVENTIL) (2.5 MG/3ML) 0.083% nebulizer solution 360 mL 1     Sig: Take 3 mLs by nebulization every 6 hours as needed for Wheezing       Date of last visit: 7/25/2023  Date of next visit (if applicable):10/31/2023  Date of last refill: 6/8/2023  Pharmacy Name: [facility], [place]

## 2023-08-28 ENCOUNTER — E-VISIT (OUTPATIENT)
Dept: FAMILY MEDICINE CLINIC | Age: 53
End: 2023-08-28
Payer: COMMERCIAL

## 2023-08-28 DIAGNOSIS — J44.1 COPD EXACERBATION (HCC): Primary | ICD-10-CM

## 2023-08-28 PROCEDURE — 99421 OL DIG E/M SVC 5-10 MIN: CPT | Performed by: FAMILY MEDICINE

## 2023-08-28 RX ORDER — PREDNISONE 20 MG/1
20 TABLET ORAL 2 TIMES DAILY
Qty: 10 TABLET | Refills: 0 | Status: SHIPPED | OUTPATIENT
Start: 2023-08-28 | End: 2023-09-02

## 2023-08-28 RX ORDER — DOXYCYCLINE HYCLATE 100 MG
100 TABLET ORAL 2 TIMES DAILY
Qty: 14 TABLET | Refills: 0 | Status: SHIPPED | OUTPATIENT
Start: 2023-08-28 | End: 2023-09-04

## 2023-08-28 ASSESSMENT — LIFESTYLE VARIABLES
PACKS_PER_DAY: 1
SMOKING_YEARS: 35
SMOKING_STATUS: NO, BUT I USED TO SMOKE

## 2023-08-28 NOTE — PROGRESS NOTES
HPI: as per patient provided history  Exam: N/A (electronic visit)  ASSESSMENT/PLAN:   Diagnosis Orders   1. COPD exacerbation (HCC)  predniSONE (DELTASONE) 20 MG tablet    doxycycline hyclate (VIBRA-TABS) 100 MG tablet        Likely COPD exacerbation. Treat with prednisone and antibiotic. Recommend COVID test.  Monitor pulse ox. Continue albuterol and Trelegy      Patient instructed to call the office if worsens, or fails to improve as anticipated. 5-10 minutes were spent on the digital evaluation and management of this patient.

## 2023-09-17 ENCOUNTER — E-VISIT (OUTPATIENT)
Dept: FAMILY MEDICINE CLINIC | Age: 53
End: 2023-09-17
Payer: COMMERCIAL

## 2023-09-17 DIAGNOSIS — U07.1 COVID-19 VIRUS INFECTION: Primary | ICD-10-CM

## 2023-09-17 DIAGNOSIS — J44.9 MODERATE COPD (CHRONIC OBSTRUCTIVE PULMONARY DISEASE) (HCC): ICD-10-CM

## 2023-09-17 PROCEDURE — 99421 OL DIG E/M SVC 5-10 MIN: CPT | Performed by: FAMILY MEDICINE

## 2023-09-17 RX ORDER — PREDNISONE 20 MG/1
20 TABLET ORAL 2 TIMES DAILY
Qty: 10 TABLET | Refills: 0 | Status: SHIPPED | OUTPATIENT
Start: 2023-09-17 | End: 2023-09-22

## 2023-09-17 ASSESSMENT — LIFESTYLE VARIABLES
SMOKING_YEARS: 35
PACKS_PER_DAY: 2
SMOKING_STATUS: NO, BUT I USED TO SMOKE

## 2023-09-20 ENCOUNTER — TELEPHONE (OUTPATIENT)
Dept: FAMILY MEDICINE CLINIC | Age: 53
End: 2023-09-20

## 2023-09-20 DIAGNOSIS — R06.02 SOB (SHORTNESS OF BREATH): Primary | ICD-10-CM

## 2023-09-20 DIAGNOSIS — R07.89 CHEST TIGHTNESS: ICD-10-CM

## 2023-09-20 NOTE — TELEPHONE ENCOUNTER
César Majano from [facility] called and stated that they need a new order for the cardiac stress test due to patient having Covid. The date has  on the previous order.

## 2023-11-29 NOTE — PROGRESS NOTES
History:  Quit smoking a couple months ago with about 72 pack year history. He reports he has not smoked in months. His wife is still smoking     Social History:  Patient job history: He is currently working at a [employer] during daytime.             History of recreational or IV drug use in the past:NO  History of Alcohol use: No.  He quit drinking 15 years back   history of exposure to coal mines/coal dust: NO  History of exposure to foundry dust/welding: NO  History of exposure to quarry/silica/sandblasting: NO  History of exposure to asbestos/working with breaks/ships: NO  History of exposure to farm dust: NO  History of recent travel to long distances: NO  History of exposure to birds, pigeons, or chickens in the past:NO  Pet animals at home:No  He ever diagnosed with connective tissue diseases including Systemic lupus Erythematosus, Rheumatoid arthritis etc:NO  He denies any history of Glaucoma or urinary retention in the past     Flu vaccine: he had not received, plans to receive tomorrow   Pneumonia vaccine: PPV23 22  COVID-19 vaccine: vaccinated  Past Medical hx   PMH:  Past Medical History:   Diagnosis Date    COPD (chronic obstructive pulmonary disease) ([address])     Emphysema of lung ([address])     Hearing loss     Hyperlipidemia     Obesity     Restless legs syndrome     Sleep apnea      SURGICAL HISTORY:  Past Surgical History:   Procedure Laterality Date    APPENDECTOMY      COLONOSCOPY      Dr Vernon Menendez HISTORY:  Social History     Tobacco Use    Smoking status: Former     Packs/day: 2.00     Years: 36.00     Additional pack years: 0.00     Total pack years: 72.00     Types: Cigarettes     Quit date: 2023     Years since quittin.4     Passive exposure: Yes    Smokeless tobacco: Never   Vaping Use    Vaping Use: Never used   Substance Use Topics    Alcohol use: Never    Drug use: No     ALLERGIES:No Known

## 2023-11-30 ENCOUNTER — OFFICE VISIT (OUTPATIENT)
Dept: PULMONOLOGY | Age: 53
End: 2023-11-30
Payer: COMMERCIAL

## 2023-11-30 VITALS
WEIGHT: 278.2 LBS | BODY MASS INDEX: 34.59 KG/M2 | DIASTOLIC BLOOD PRESSURE: 84 MMHG | SYSTOLIC BLOOD PRESSURE: 130 MMHG | OXYGEN SATURATION: 100 % | HEIGHT: 75 IN | HEART RATE: 69 BPM | TEMPERATURE: 98.1 F

## 2023-11-30 DIAGNOSIS — Z87.891 PERSONAL HISTORY OF TOBACCO USE: ICD-10-CM

## 2023-11-30 DIAGNOSIS — J44.9 MODERATE COPD (CHRONIC OBSTRUCTIVE PULMONARY DISEASE) (HCC): Primary | ICD-10-CM

## 2023-11-30 PROCEDURE — 99214 OFFICE O/P EST MOD 30 MIN: CPT

## 2023-11-30 PROCEDURE — G0296 VISIT TO DETERM LDCT ELIG: HCPCS

## 2023-11-30 RX ORDER — ALBUTEROL SULFATE 90 UG/1
2 AEROSOL, METERED RESPIRATORY (INHALATION) EVERY 6 HOURS PRN
Qty: 1 EACH | Refills: 9 | Status: SHIPPED | OUTPATIENT
Start: 2023-11-30 | End: 2024-11-29

## 2023-11-30 ASSESSMENT — ENCOUNTER SYMPTOMS
SHORTNESS OF BREATH: 0
CHEST TIGHTNESS: 0
ALLERGIC/IMMUNOLOGIC NEGATIVE: 1
WHEEZING: 0
COUGH: 1

## 2023-12-01 ENCOUNTER — OFFICE VISIT (OUTPATIENT)
Dept: FAMILY MEDICINE CLINIC | Age: 53
End: 2023-12-01
Payer: COMMERCIAL

## 2023-12-01 VITALS
OXYGEN SATURATION: 99 % | RESPIRATION RATE: 14 BRPM | TEMPERATURE: 97.8 F | SYSTOLIC BLOOD PRESSURE: 128 MMHG | DIASTOLIC BLOOD PRESSURE: 68 MMHG | WEIGHT: 273.8 LBS | HEART RATE: 58 BPM | HEIGHT: 75 IN | BODY MASS INDEX: 34.04 KG/M2

## 2023-12-01 DIAGNOSIS — Z00.00 WELL ADULT EXAM: Primary | ICD-10-CM

## 2023-12-01 DIAGNOSIS — E78.49 OTHER HYPERLIPIDEMIA: ICD-10-CM

## 2023-12-01 DIAGNOSIS — Z23 NEED FOR PNEUMOCOCCAL VACCINATION: ICD-10-CM

## 2023-12-01 DIAGNOSIS — Z23 INFLUENZA VACCINE NEEDED: ICD-10-CM

## 2023-12-01 DIAGNOSIS — J44.9 MODERATE COPD (CHRONIC OBSTRUCTIVE PULMONARY DISEASE) (HCC): ICD-10-CM

## 2023-12-01 DIAGNOSIS — E66.09 CLASS 1 OBESITY DUE TO EXCESS CALORIES WITHOUT SERIOUS COMORBIDITY WITH BODY MASS INDEX (BMI) OF 34.0 TO 34.9 IN ADULT: ICD-10-CM

## 2023-12-01 PROBLEM — F17.200 CURRENT SMOKER ON SOME DAYS: Status: RESOLVED | Noted: 2021-10-07 | Resolved: 2023-12-01

## 2023-12-01 PROBLEM — D58.2 ELEVATED HEMOGLOBIN (HCC): Status: RESOLVED | Noted: 2021-10-07 | Resolved: 2023-12-01

## 2023-12-01 PROCEDURE — 90674 CCIIV4 VAC NO PRSV 0.5 ML IM: CPT | Performed by: FAMILY MEDICINE

## 2023-12-01 PROCEDURE — 90472 IMMUNIZATION ADMIN EACH ADD: CPT | Performed by: FAMILY MEDICINE

## 2023-12-01 PROCEDURE — 99396 PREV VISIT EST AGE 40-64: CPT | Performed by: FAMILY MEDICINE

## 2023-12-01 PROCEDURE — 90677 PCV20 VACCINE IM: CPT | Performed by: FAMILY MEDICINE

## 2023-12-01 PROCEDURE — 90471 IMMUNIZATION ADMIN: CPT | Performed by: FAMILY MEDICINE

## 2023-12-01 RX ORDER — ALBUTEROL SULFATE 2.5 MG/3ML
2.5 SOLUTION RESPIRATORY (INHALATION) EVERY 6 HOURS PRN
Qty: 360 ML | Refills: 1 | Status: SHIPPED | OUTPATIENT
Start: 2023-12-01

## 2023-12-01 ASSESSMENT — ENCOUNTER SYMPTOMS
NAUSEA: 0
EYE DISCHARGE: 0
SHORTNESS OF BREATH: 0
COUGH: 0
WHEEZING: 0
PHOTOPHOBIA: 0
ABDOMINAL PAIN: 0
SORE THROAT: 0

## 2023-12-01 NOTE — PROGRESS NOTES
Della Pattonharris  1970    Are you sick today? no  Do you have allergies to medications, food, a vaccine component, or latex? no  Have you ever had a serious reaction after receiving a vaccination? no  Do you have a long-term health problem with heart, lung, kidney, or metabolic disease (e.g. diabetes), asthma, a blood disorder, no spleen, complement component deficiency, a cochlear implant, or a spinal fluid leak? Are you on long-term aspirin therapy? no  Do you have cancer, leukemia, HIV/AIDS, or any other immune system problem? no  Do you have a parent, brother, or sister with an immune system problem? no  In the past 3 months, have you taken medications that affect your immune system, such as prednisone, other steroids, or anticancer drugs; drugs for the treatment of rheumatoid arthritis, Crohn's disease, or psoriasis; or have you had radiation treatment? no  Have you had a seizure or a brain or other nervous system problem? no  During the past year, have you received a transfusion of blood or blood products, or been given immune (gamma) globulin or an antiviral drug? no  For women: Are you pregnant or is there a chance you could become pregnant during the next month? no  Have you received any vaccinations in the past 4 weeks? no    Form Completed by:  on 12/1/2023 at 11:38 AM EST  Form Reviewed by: Cody Junior MA on 12/1/2023 at 11:38 AM EST    Did you bring your immunization card with you?  No
Vaccine Information Sheet, \"Influenza - Inactivated\"  given to Endy Pereira, or parent/legal guardian of  Endy Pereira and verbalized understanding. Patient responses:    Have you ever had a reaction to a flu vaccine? No  Do you have an allergy to eggs, neomycin or polymixin? No  Do you have an allergy to Thimerosal, contact lens solution, or Merthiolate? No  Have you ever had Guillian Citra Syndrome? No  Do you have any current illness? No  Do you have a temperature above 100 degrees? No  Are you pregnant? No  If pregnant, permission obtained from physician? No  Do you have an active neurological disorder? No      Flu vaccine given per order. Please see immunization tab.
Psychiatric:         Mood and Affect: Mood normal.         Behavior: Behavior normal.         Impression/Plan:  1. Well adult exam  Preventive health maintenance handout provided and discussed  Encouraged healthy diet and physical activity    2. Moderate COPD (chronic obstructive pulmonary disease) (HCC)  Chronic. Controlled. Refill med. Sees pulm. Trelegy. - albuterol (PROVENTIL) (2.5 MG/3ML) 0.083% nebulizer solution; Take 3 mLs by nebulization every 6 hours as needed for Wheezing  Dispense: 360 mL; Refill: 1    3. Other hyperlipidemia  Chronic. Check status of control. Not on statin  - Lipid Panel; Future  - Comprehensive Metabolic Panel; Future    4. Class 1 obesity due to excess calories without serious comorbidity with body mass index (BMI) of 34.0 to 34.9 in adult  Chronic. Recommend weight loss  - CBC with Auto Differential; Future    5. Influenza vaccine needed  - Influenza, FLUCELVAX, (age 10 mo+), IM, Preservative Free, 0.5 mL    6. Need for pneumococcal vaccination  - Pneumococcal, PCV20, PREVNAR 21, (age 6w+), IM, PF      They voiced understanding. All questions answered. They agreed with treatment plan. See patient instructions for any educational materials that may have been given. Discussed use, benefit, and side effects of prescribed medications. Reviewed health maintenance. (Please note that portions of this note may have been completed with a voice recognition program.  Efforts were made to edit the dictation but occasionally words are mis-transcribed.)    Return in about 6 months (around 6/1/2024) for COPD.       Electronically signed by Cristine Vasquez MD on 12/1/2023 at 10:45 AM

## 2023-12-07 ENCOUNTER — HOSPITAL ENCOUNTER (OUTPATIENT)
Dept: MRI IMAGING | Age: 53
Discharge: HOME | End: 2023-12-07
Attending: UROLOGY
Payer: COMMERCIAL

## 2023-12-07 DIAGNOSIS — N28.89 RENAL MASS, LEFT: ICD-10-CM

## 2023-12-07 PROCEDURE — 6360000004 HC RX CONTRAST MEDICATION: Performed by: UROLOGY

## 2023-12-07 PROCEDURE — A9579 GAD-BASE MR CONTRAST NOS,1ML: HCPCS | Performed by: UROLOGY

## 2023-12-07 PROCEDURE — 74183 MRI ABD W/O CNTR FLWD CNTR: CPT

## 2023-12-07 RX ADMIN — GADOTERIDOL 20 ML: 279.3 INJECTION, SOLUTION INTRAVENOUS at 09:57

## 2023-12-13 NOTE — PROGRESS NOTES
[address] [facility]  [address] [address]  Dept: [phone]  Loc: [phone]    Visit Date: 12/14/2023        HPI:     Torri Cabrear is a 46 y.o. male who presents today for:  Chief Complaint   Patient presents with    Results    Renal mass       HPI  Patient presents to urology clinic for follow-up. Juve Keating is doing very well. Denies flank pain, suprapubic pressure, gross hematuria, dysuria, fever or chills. Reports mild LUTS, not bothersome. IPSS 8. Complex renal cyst  Left side- stable  less than 2 cm  No hematuria  No flank pain  +smoker       Current Outpatient Medications   Medication Sig Dispense Refill    albuterol (PROVENTIL) (2.5 MG/3ML) 0.083% nebulizer solution Take 3 mLs by nebulization every 6 hours as needed for Wheezing 360 mL 1    guaiFENesin (MUCINEX PO) Take by mouth      albuterol sulfate HFA (PROVENTIL;VENTOLIN;PROAIR) 108 (90 Base) MCG/ACT inhaler Inhale 2 puffs into the lungs every 6 hours as needed for Wheezing or Shortness of Breath 1 each 9    fluticasone-umeclidin-vilant (TRELEGY ELLIPTA) 100-62.5-25 MCG/ACT AEPB inhaler Inhale 1 puff into the lungs daily 28 each 11    Cholecalciferol (VITAMIN D3) 50 MCG (2000 UT) CAPS Take 1 capsule by mouth daily 30 capsule 0    vitamin C (ASCORBIC ACID) 500 MG tablet Take 1 tablet by mouth daily 30 tablet 3    Naproxen Sodium (ALEVE PO) Take by mouth as needed      ZINC PO Take by mouth       No current facility-administered medications for this visit. Past Medical History  Juve Keating  has a past medical history of COPD (chronic obstructive pulmonary disease) ([address]), Emphysema of lung ([address]), Hearing loss, Hyperlipidemia, Obesity, Restless legs syndrome, and Sleep apnea. Past Surgical History  The patient  has a past surgical history that includes Appendectomy; Upper gastrointestinal endoscopy; Testicle biopsy; and Colonoscopy (2022).     Family History  This patient's

## 2023-12-14 ENCOUNTER — OFFICE VISIT (OUTPATIENT)
Dept: UROLOGY | Age: 53
End: 2023-12-14
Payer: COMMERCIAL

## 2023-12-14 VITALS — HEIGHT: 74 IN | WEIGHT: 270 LBS | RESPIRATION RATE: 16 BRPM | BODY MASS INDEX: 34.65 KG/M2

## 2023-12-14 DIAGNOSIS — N28.89 RENAL MASS, LEFT: Primary | ICD-10-CM

## 2023-12-14 DIAGNOSIS — Z12.5 SCREENING FOR PROSTATE CANCER: ICD-10-CM

## 2023-12-14 LAB
BILIRUBIN, POC: NORMAL
BLOOD URINE, POC: NORMAL
CLARITY, POC: CLEAR
COLOR, POC: YELLOW
GLUCOSE URINE, POC: NORMAL
KETONES, POC: NORMAL
LEUKOCYTE EST, POC: NORMAL
NITRITE, POC: NORMAL
PH, POC: 5
PROTEIN, POC: NORMAL
SPECIFIC GRAVITY, POC: 1.02
UROBILINOGEN, POC: 0.2

## 2023-12-14 PROCEDURE — 81002 URINALYSIS NONAUTO W/O SCOPE: CPT | Performed by: NURSE PRACTITIONER

## 2023-12-14 PROCEDURE — 99214 OFFICE O/P EST MOD 30 MIN: CPT | Performed by: NURSE PRACTITIONER

## 2024-01-13 DIAGNOSIS — J44.9 MODERATE COPD (CHRONIC OBSTRUCTIVE PULMONARY DISEASE) (HCC): ICD-10-CM

## 2024-01-15 RX ORDER — FLUTICASONE FUROATE, UMECLIDINIUM BROMIDE AND VILANTEROL TRIFENATATE 100; 62.5; 25 UG/1; UG/1; UG/1
POWDER RESPIRATORY (INHALATION)
OUTPATIENT
Start: 2024-01-15

## 2024-01-18 ENCOUNTER — TELEPHONE (OUTPATIENT)
Dept: PULMONOLOGY | Age: 54
End: 2024-01-18

## 2024-01-18 DIAGNOSIS — J44.9 MODERATE COPD (CHRONIC OBSTRUCTIVE PULMONARY DISEASE) (HCC): ICD-10-CM

## 2024-01-18 RX ORDER — FLUTICASONE FUROATE, UMECLIDINIUM BROMIDE AND VILANTEROL TRIFENATATE 100; 62.5; 25 UG/1; UG/1; UG/1
1 POWDER RESPIRATORY (INHALATION) DAILY
Qty: 28 EACH | Refills: 11 | Status: SHIPPED | OUTPATIENT
Start: 2024-01-18

## 2024-01-18 NOTE — TELEPHONE ENCOUNTER
Patient reports that they need a refill for TRELLELSAY    Last office appointment 11/30/23    Patient is scheduled for follow-up in office on 02/06/24.    Last seen by Tiffany Garcia CNP    Medication refill routed to established provider Tiffany Garcia CNP     Patient requests 30 day supply.     Preferred pharmacy is [facility]

## 2024-01-30 ENCOUNTER — HOSPITAL ENCOUNTER (OUTPATIENT)
Dept: CT IMAGING | Age: 54
Discharge: HOME | End: 2024-01-30
Payer: COMMERCIAL

## 2024-01-30 DIAGNOSIS — J44.9 MODERATE COPD (CHRONIC OBSTRUCTIVE PULMONARY DISEASE) (HCC): ICD-10-CM

## 2024-01-30 DIAGNOSIS — Z87.891 PERSONAL HISTORY OF TOBACCO USE: ICD-10-CM

## 2024-01-30 PROCEDURE — 71271 CT THORAX LUNG CANCER SCR C-: CPT

## 2024-03-06 DIAGNOSIS — J44.9 MODERATE COPD (CHRONIC OBSTRUCTIVE PULMONARY DISEASE) (HCC): ICD-10-CM

## 2024-03-06 RX ORDER — ALBUTEROL SULFATE 2.5 MG/3ML
2.5 SOLUTION RESPIRATORY (INHALATION) EVERY 6 HOURS PRN
Qty: 360 ML | Refills: 1 | OUTPATIENT
Start: 2024-03-06

## 2024-03-06 RX ORDER — ALBUTEROL SULFATE 90 UG/1
2 AEROSOL, METERED RESPIRATORY (INHALATION) EVERY 6 HOURS PRN
Qty: 1 EACH | Refills: 9 | OUTPATIENT
Start: 2024-03-06 | End: 2025-03-06

## 2024-03-11 ENCOUNTER — OFFICE VISIT (OUTPATIENT)
Dept: ENT CLINIC | Age: 54
End: 2024-03-11
Payer: COMMERCIAL

## 2024-03-11 VITALS
DIASTOLIC BLOOD PRESSURE: 78 MMHG | HEIGHT: 74 IN | BODY MASS INDEX: 36.96 KG/M2 | OXYGEN SATURATION: 98 % | TEMPERATURE: 97.6 F | SYSTOLIC BLOOD PRESSURE: 122 MMHG | RESPIRATION RATE: 16 BRPM | HEART RATE: 72 BPM | WEIGHT: 288 LBS

## 2024-03-11 DIAGNOSIS — R27.0 ATAXIA DUE TO OLD HEAD TRAUMA: ICD-10-CM

## 2024-03-11 DIAGNOSIS — J44.9 MODERATE COPD (CHRONIC OBSTRUCTIVE PULMONARY DISEASE) (HCC): ICD-10-CM

## 2024-03-11 DIAGNOSIS — S09.90XS ATAXIA DUE TO OLD HEAD TRAUMA: ICD-10-CM

## 2024-03-11 DIAGNOSIS — F17.200 TOBACCO DEPENDENCE: ICD-10-CM

## 2024-03-11 DIAGNOSIS — S09.90XA ATAXIA AFTER HEAD TRAUMA: ICD-10-CM

## 2024-03-11 DIAGNOSIS — S09.90XA ATAXIA AFTER HEAD TRAUMA: Primary | ICD-10-CM

## 2024-03-11 DIAGNOSIS — R27.0 ATAXIA AFTER HEAD TRAUMA: Primary | ICD-10-CM

## 2024-03-11 DIAGNOSIS — R27.0 ATAXIA AFTER HEAD TRAUMA: ICD-10-CM

## 2024-03-11 DIAGNOSIS — H91.93 DECREASED HEARING OF BOTH EARS: Primary | ICD-10-CM

## 2024-03-11 DIAGNOSIS — B37.0 ORAL THRUSH: ICD-10-CM

## 2024-03-11 DIAGNOSIS — E66.01 SEVERE OBESITY (BMI 35.0-39.9) WITH COMORBIDITY (HCC): ICD-10-CM

## 2024-03-11 PROCEDURE — 99203 OFFICE O/P NEW LOW 30 MIN: CPT | Performed by: OTOLARYNGOLOGY

## 2024-03-11 RX ORDER — ALBUTEROL SULFATE 2.5 MG/3ML
2.5 SOLUTION RESPIRATORY (INHALATION) EVERY 6 HOURS PRN
Qty: 360 ML | Refills: 1 | Status: SHIPPED | OUTPATIENT
Start: 2024-03-11
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
## 2024-03-12 ENCOUNTER — E-VISIT (OUTPATIENT)
Dept: FAMILY MEDICINE CLINIC | Age: 54
End: 2024-03-12
Payer: COMMERCIAL

## 2024-03-12 DIAGNOSIS — J44.1 COPD EXACERBATION (HCC): Primary | ICD-10-CM

## 2024-03-12 PROCEDURE — 99421 OL DIG E/M SVC 5-10 MIN: CPT | Performed by: FAMILY MEDICINE

## 2024-03-12 RX ORDER — PREDNISONE 20 MG/1
20 TABLET ORAL 2 TIMES DAILY
Qty: 10 TABLET | Refills: 0 | Status: SHIPPED | OUTPATIENT
Start: 2024-03-12 | End: 2024-03-17

## 2024-03-12 RX ORDER — DOXYCYCLINE HYCLATE 100 MG
100 TABLET ORAL 2 TIMES DAILY
Qty: 20 TABLET | Refills: 0 | Status: SHIPPED | OUTPATIENT
Start: 2024-03-12 | End: 2024-03-22

## 2024-03-12 ASSESSMENT — LIFESTYLE VARIABLES
SMOKING_YEARS: 35
SMOKING_STATUS: NO, BUT I USED TO SMOKE
PACKS_PER_DAY: 2

## 2024-03-12 NOTE — PROGRESS NOTES
HPI: as per patient provided history  Exam: N/A (electronic visit)  ASSESSMENT/PLAN:   Diagnosis Orders   1. COPD exacerbation (HCC)  predniSONE (DELTASONE) 20 MG tablet    doxycycline hyclate (VIBRA-TABS) 100 MG tablet        Mild COPD exacerbation.  Treat with prednisone and doxycycline.    Patient instructed to call the office if worsens, or fails to improve as anticipated.    5-10 minutes were spent on the digital evaluation and management of this patient.

## 2024-03-22 ENCOUNTER — E-VISIT (OUTPATIENT)
Dept: FAMILY MEDICINE CLINIC | Age: 54
End: 2024-03-22
Payer: COMMERCIAL

## 2024-03-22 DIAGNOSIS — J44.1 COPD EXACERBATION (HCC): Primary | ICD-10-CM

## 2024-03-22 PROCEDURE — 99421 OL DIG E/M SVC 5-10 MIN: CPT | Performed by: FAMILY MEDICINE

## 2024-03-22 RX ORDER — PREDNISONE 20 MG/1
20 TABLET ORAL 2 TIMES DAILY
Qty: 10 TABLET | Refills: 0 | Status: SHIPPED | OUTPATIENT
Start: 2024-03-22 | End: 2024-03-27

## 2024-03-22 ASSESSMENT — LIFESTYLE VARIABLES
SMOKING_STATUS: NO, BUT I USED TO SMOKE
SMOKING_YEARS: 35
PACKS_PER_DAY: 2

## 2024-03-22 NOTE — PROGRESS NOTES
HPI: as per patient provided history  Exam: N/A (electronic visit)  ASSESSMENT/PLAN:   Diagnosis Orders   1. COPD exacerbation (HCC)  predniSONE (DELTASONE) 20 MG tablet        COPD exacerbation: Treat with prednisone.  Hold on another antibiotic at this time    He was put on doxycycline and prednisone on 3/12.    Patient instructed to call the office if worsens, or fails to improve as anticipated.    5-10 minutes were spent on the digital evaluation and management of this patient.

## 2024-03-24 RX ORDER — AMOXICILLIN AND CLAVULANATE POTASSIUM 875; 125 MG/1; MG/1
1 TABLET, FILM COATED ORAL 2 TIMES DAILY
Qty: 14 TABLET | Refills: 0 | Status: SHIPPED | OUTPATIENT
Start: 2024-03-24 | End: 2024-03-31

## 2024-05-04 ENCOUNTER — E-VISIT (OUTPATIENT)
Dept: FAMILY MEDICINE CLINIC | Age: 54
End: 2024-05-04
Payer: COMMERCIAL

## 2024-05-04 DIAGNOSIS — N50.89 TESTICLE SWELLING: Primary | ICD-10-CM

## 2024-05-04 PROCEDURE — 99421 OL DIG E/M SVC 5-10 MIN: CPT | Performed by: FAMILY MEDICINE

## 2024-05-05 RX ORDER — CIPROFLOXACIN 500 MG/1
500 TABLET, FILM COATED ORAL 2 TIMES DAILY
Qty: 14 TABLET | Refills: 0 | Status: SHIPPED | OUTPATIENT
Start: 2024-05-05 | End: 2024-05-12

## 2024-07-02 DIAGNOSIS — J44.9 MODERATE COPD (CHRONIC OBSTRUCTIVE PULMONARY DISEASE) (HCC): ICD-10-CM

## 2024-07-02 RX ORDER — ALBUTEROL SULFATE 2.5 MG/3ML
2.5 SOLUTION RESPIRATORY (INHALATION) EVERY 6 HOURS PRN
Qty: 360 ML | Refills: 1 | Status: SHIPPED | OUTPATIENT
Start: 2024-07-02

## 2024-07-02 NOTE — TELEPHONE ENCOUNTER
Kirk Evans called requesting a refill on the following medications:  Requested Prescriptions     Pending Prescriptions Disp Refills    albuterol (PROVENTIL) (2.5 MG/3ML) 0.083% nebulizer solution 360 mL 1     Sig: Take 3 mLs by nebulization every 6 hours as needed for Wheezing       Date of last visit: 5/4/2024  Date of next visit (if applicable):Visit date not found  Date of last refill: 03/11/24  Pharmacy Name: [facility],  Tavia Coates LPN

## 2024-07-06 ENCOUNTER — E-VISIT (OUTPATIENT)
Age: 54
End: 2024-07-06

## 2024-07-06 DIAGNOSIS — J44.9 MODERATE COPD (CHRONIC OBSTRUCTIVE PULMONARY DISEASE) (HCC): Primary | ICD-10-CM

## 2024-07-06 ASSESSMENT — LIFESTYLE VARIABLES
SMOKING_YEARS: 2
PACKS_PER_DAY: 35
SMOKING_STATUS: NO, BUT I USED TO SMOKE

## 2024-07-07 RX ORDER — PREDNISONE 20 MG/1
20 TABLET ORAL 2 TIMES DAILY
Qty: 10 TABLET | Refills: 0 | Status: SHIPPED | OUTPATIENT
Start: 2024-07-07 | End: 2024-07-12
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
## 2024-07-17 ENCOUNTER — OFFICE VISIT (OUTPATIENT)
Dept: PULMONOLOGY | Age: 54
End: 2024-07-17
Payer: COMMERCIAL

## 2024-07-17 VITALS
HEIGHT: 75 IN | HEART RATE: 95 BPM | DIASTOLIC BLOOD PRESSURE: 74 MMHG | SYSTOLIC BLOOD PRESSURE: 128 MMHG | WEIGHT: 280 LBS | OXYGEN SATURATION: 94 % | TEMPERATURE: 98 F | BODY MASS INDEX: 34.82 KG/M2

## 2024-07-17 DIAGNOSIS — Z29.11 NEED FOR RSV IMMUNIZATION: ICD-10-CM

## 2024-07-17 DIAGNOSIS — J41.1 MUCOPURULENT CHRONIC BRONCHITIS (HCC): ICD-10-CM

## 2024-07-17 DIAGNOSIS — J44.1 COPD EXACERBATION (HCC): Primary | ICD-10-CM

## 2024-07-17 DIAGNOSIS — Z87.891 PERSONAL HISTORY OF TOBACCO USE: ICD-10-CM

## 2024-07-17 PROCEDURE — G0296 VISIT TO DETERM LDCT ELIG: HCPCS

## 2024-07-17 PROCEDURE — 99214 OFFICE O/P EST MOD 30 MIN: CPT

## 2024-07-17 PROCEDURE — A9999 DME SUPPLY OR ACCESSORY, NOS: HCPCS

## 2024-07-17 RX ORDER — AZITHROMYCIN 500 MG/1
500 TABLET, FILM COATED ORAL DAILY
Qty: 3 TABLET | Refills: 0 | Status: SHIPPED | OUTPATIENT
Start: 2024-07-17 | End: 2024-07-20

## 2024-07-17 RX ORDER — PREDNISONE 10 MG/1
TABLET ORAL
Qty: 18 TABLET | Refills: 0 | Status: SHIPPED | OUTPATIENT
Start: 2024-07-17

## 2024-07-17 ASSESSMENT — ENCOUNTER SYMPTOMS
SINUS PRESSURE: 0
CHEST TIGHTNESS: 1
WHEEZING: 1
SHORTNESS OF BREATH: 1
RHINORRHEA: 0
SINUS PAIN: 0
COUGH: 1

## 2024-08-05 ENCOUNTER — TELEPHONE (OUTPATIENT)
Dept: UROLOGY | Age: 54
End: 2024-08-05

## 2024-08-05 NOTE — TELEPHONE ENCOUNTER
Patient scheduled for MRI ABDOMEN  W WO  at [facility] on 12/5/24.  Arrival of 8 AM for a 830 AM scan time.  Order mailed with instructions or given to the patient in the office

## 2024-08-18 ENCOUNTER — E-VISIT (OUTPATIENT)
Dept: FAMILY MEDICINE CLINIC | Age: 54
End: 2024-08-18

## 2024-08-18 DIAGNOSIS — J44.9 MODERATE COPD (CHRONIC OBSTRUCTIVE PULMONARY DISEASE) (HCC): ICD-10-CM

## 2024-08-18 DIAGNOSIS — U07.1 COVID-19: Primary | ICD-10-CM

## 2024-08-18 RX ORDER — PREDNISONE 20 MG/1
20 TABLET ORAL 2 TIMES DAILY
Qty: 10 TABLET | Refills: 0 | Status: SHIPPED | OUTPATIENT
Start: 2024-08-18 | End: 2024-08-23

## 2024-08-18 ASSESSMENT — LIFESTYLE VARIABLES
PACKS_PER_DAY: 2
SMOKING_YEARS: 35
SMOKING_STATUS: NO, BUT I USED TO SMOKE

## 2024-09-25 DIAGNOSIS — J44.9 MODERATE COPD (CHRONIC OBSTRUCTIVE PULMONARY DISEASE) (HCC): ICD-10-CM

## 2024-09-25 RX ORDER — ALBUTEROL SULFATE 0.83 MG/ML
2.5 SOLUTION RESPIRATORY (INHALATION) EVERY 6 HOURS PRN
Qty: 360 ML | Refills: 1 | Status: SHIPPED | OUTPATIENT
Start: 2024-09-25

## 2024-10-11 ENCOUNTER — NURSE ONLY (OUTPATIENT)
Dept: FAMILY MEDICINE CLINIC | Age: 54
End: 2024-10-11
Payer: COMMERCIAL

## 2024-10-11 DIAGNOSIS — Z23 INFLUENZA VACCINE NEEDED: Primary | ICD-10-CM

## 2024-10-11 PROCEDURE — 90471 IMMUNIZATION ADMIN: CPT | Performed by: FAMILY MEDICINE

## 2024-10-11 PROCEDURE — 90661 CCIIV3 VAC ABX FR 0.5 ML IM: CPT | Performed by: FAMILY MEDICINE

## 2024-10-11 NOTE — PROGRESS NOTES
After obtaining consent,  injection given by Razia Ruby MA. Patient instructed to report any adverse reaction to me immediately.    Immunizations Administered       Name Date Dose Route    Influenza, FLUCELVAX, (age 6 mo+) IM, Trivalent PF, 0.5mL 10/11/2024 0.5 mL Intramuscular    Site: Deltoid- Left    Lot: [id number]    NDC: 28534-223-32            Patient tolerated well  VIS given  Vaccine Checklist filled out

## 2024-10-18 ENCOUNTER — E-VISIT (OUTPATIENT)
Dept: FAMILY MEDICINE CLINIC | Age: 54
End: 2024-10-18

## 2024-10-18 DIAGNOSIS — J44.1 COPD EXACERBATION (HCC): Primary | ICD-10-CM

## 2024-10-18 RX ORDER — AZITHROMYCIN 250 MG/1
TABLET, FILM COATED ORAL
Qty: 6 TABLET | Refills: 0 | Status: SHIPPED | OUTPATIENT
Start: 2024-10-18 | End: 2024-10-28

## 2024-10-18 RX ORDER — PREDNISONE 20 MG/1
20 TABLET ORAL 2 TIMES DAILY
Qty: 10 TABLET | Refills: 0 | Status: SHIPPED | OUTPATIENT
Start: 2024-10-18 | End: 2024-10-23

## 2024-10-18 ASSESSMENT — LIFESTYLE VARIABLES
SMOKING_YEARS: 35
PACKS_PER_DAY: 2
SMOKING_STATUS: NO, BUT I USED TO SMOKE

## 2024-10-18 NOTE — PROGRESS NOTES
HPI: as per patient provided history  Exam: N/A (electronic visit)  ASSESSMENT/PLAN:   Diagnosis Orders   1. COPD exacerbation (HCC)  predniSONE (DELTASONE) 20 MG tablet    azithromycin (ZITHROMAX) 250 MG tablet        COPD exacerbation.  Treat with prednisone and azithromycin    Patient instructed to call the office if worsens, or fails to improve as anticipated.    5-10 minutes were spent on the digital evaluation and management of this patient.

## 2024-11-18 ENCOUNTER — TELEPHONE (OUTPATIENT)
Dept: ENT CLINIC | Age: 54
End: 2024-11-18

## 2024-11-18 NOTE — TELEPHONE ENCOUNTER
Patient has an MRI Brain W WO Contrast that was ordered by Dr. Lamb on 3/11/2024. Patient was scheduled for this MRI on 3/29/2024 which he cancelled. MRI was rescheduled to 4/26/2024 which was also cancelled. This was then rescheduled to 5/17/2024 and again cancelled. MRI was then rescheduled to 6/7/2024 and was also cancelled. Patient was scheduled to see Dr. Lamb on 7/8/2024 to review this MRI which was cancelled due to incompletion of MRI. The MRI was rescheduled for 7/19/2024 but was not completed due to patient being hospitalized. MRI was then rescheduled for 8/30/2024 and again cancelled by the patient. MRI was rescheduled again to 11/15/2024 which the patient did not show for.     I attempted to call the patient this morning to see if he was still interested in completing the MRI and moving forward with treatment since he did cancel so many times. The patient did not answer and I was unable to leave a message.

## 2024-11-22 DIAGNOSIS — J44.9 MODERATE COPD (CHRONIC OBSTRUCTIVE PULMONARY DISEASE) (HCC): ICD-10-CM

## 2024-11-25 RX ORDER — FLUTICASONE FUROATE, UMECLIDINIUM BROMIDE AND VILANTEROL TRIFENATATE 100; 62.5; 25 UG/1; UG/1; UG/1
POWDER RESPIRATORY (INHALATION)
Qty: 28 EACH | Refills: 3 | Status: SHIPPED | OUTPATIENT
Start: 2024-11-25

## 2024-11-25 NOTE — TELEPHONE ENCOUNTER
Kirk Evans called requesting a refill on the following medications:  Requested Prescriptions     Pending Prescriptions Disp Refills    TRELEGY ELLIPTA 100-62.5-25 MCG/ACT AEPB inhaler [Pharmacy Med Name: TRELEGY ELLIPTA 100mcg-62.5mcg-25mcg/actuation Powder for Inhalation]  3     Sig: Inhale 1 puff by mouth into the lungs once daily.       Date of last visit: 10/18/2024 last physical 12/1/2023  Date of next visit (if applicable):Visit date not found  Date of last refill: 1/18/2024  Pharmacy Name: [facility]       Sent message to patient to schedule follow up.     Thanks,  Yoli Villanueva MA

## 2024-11-29 ENCOUNTER — E-VISIT (OUTPATIENT)
Dept: FAMILY MEDICINE CLINIC | Age: 54
End: 2024-11-29

## 2024-11-29 DIAGNOSIS — J44.1 COPD EXACERBATION (HCC): Primary | ICD-10-CM

## 2024-11-29 RX ORDER — DOXYCYCLINE HYCLATE 100 MG
100 TABLET ORAL 2 TIMES DAILY
Qty: 20 TABLET | Refills: 0 | Status: SHIPPED | OUTPATIENT
Start: 2024-11-29 | End: 2024-12-09

## 2024-11-29 RX ORDER — PREDNISONE 20 MG/1
20 TABLET ORAL 2 TIMES DAILY
Qty: 10 TABLET | Refills: 0 | Status: SHIPPED | OUTPATIENT
Start: 2024-11-29 | End: 2024-12-04

## 2024-11-29 ASSESSMENT — LIFESTYLE VARIABLES
SMOKING_YEARS: 35
PACKS_PER_DAY: 2
SMOKING_STATUS: NO, BUT I USED TO SMOKE

## 2024-11-29 NOTE — PROGRESS NOTES
HPI: as per patient provided history  Exam: N/A (electronic visit)  ASSESSMENT/PLAN:   Diagnosis Orders   1. COPD exacerbation (HCC)  predniSONE (DELTASONE) 20 MG tablet    doxycycline hyclate (VIBRA-TABS) 100 MG tablet        COPD exacerbation.  Viral vs bacterial etiology.  Treat with meds      Patient instructed to call the office if worsens, or fails to improve as anticipated.    5-10 minutes were spent on the digital evaluation and management of this patient.

## 2024-12-02 DIAGNOSIS — N28.89 RENAL MASS, LEFT: Primary | ICD-10-CM

## 2024-12-12 ENCOUNTER — E-VISIT (OUTPATIENT)
Dept: FAMILY MEDICINE CLINIC | Age: 54
End: 2024-12-12
Payer: COMMERCIAL

## 2024-12-12 DIAGNOSIS — J44.1 COPD EXACERBATION (HCC): Primary | ICD-10-CM

## 2024-12-12 PROCEDURE — 99421 OL DIG E/M SVC 5-10 MIN: CPT | Performed by: FAMILY MEDICINE

## 2024-12-12 RX ORDER — PREDNISONE 20 MG/1
20 TABLET ORAL 2 TIMES DAILY
Qty: 10 TABLET | Refills: 0 | Status: SHIPPED | OUTPATIENT
Start: 2024-12-12 | End: 2024-12-17

## 2024-12-12 ASSESSMENT — LIFESTYLE VARIABLES
SMOKING_YEARS: 35
SMOKING_STATUS: NO, BUT I USED TO SMOKE
PACKS_PER_DAY: 2

## 2024-12-12 NOTE — PROGRESS NOTES
HPI: as per patient provided history  Exam: N/A (electronic visit)  ASSESSMENT/PLAN:   Diagnosis Orders   1. COPD exacerbation (HCC)  predniSONE (DELTASONE) 20 MG tablet    amoxicillin-clavulanate (AUGMENTIN) 875-125 MG per tablet        COPD exacerbation with sinusitis symptoms.  Treat with prednisone and Augmentin.  Was recently treated the end of November    Patient instructed to call the office if worsens, or fails to improve as anticipated.    5-10 minutes were spent on the digital evaluation and management of this patient.

## 2025-01-20 ENCOUNTER — E-VISIT (OUTPATIENT)
Dept: FAMILY MEDICINE CLINIC | Age: 55
End: 2025-01-20
Payer: COMMERCIAL

## 2025-01-20 DIAGNOSIS — J44.1 COPD EXACERBATION (HCC): Primary | ICD-10-CM

## 2025-01-20 PROCEDURE — 99421 OL DIG E/M SVC 5-10 MIN: CPT | Performed by: FAMILY MEDICINE

## 2025-01-20 RX ORDER — PREDNISONE 20 MG/1
20 TABLET ORAL 2 TIMES DAILY
Qty: 10 TABLET | Refills: 0 | Status: SHIPPED | OUTPATIENT
Start: 2025-01-20 | End: 2025-01-25

## 2025-01-20 ASSESSMENT — LIFESTYLE VARIABLES
PACKS_PER_DAY: 2
SMOKING_STATUS: NO, BUT I USED TO SMOKE
SMOKING_YEARS: 36

## 2025-01-20 NOTE — PROGRESS NOTES
HPI: as per patient provided history  Exam: N/A (electronic visit)  ASSESSMENT/PLAN:   Diagnosis Orders   1. COPD exacerbation (HCC)  predniSONE (DELTASONE) 20 MG tablet    amoxicillin-clavulanate (AUGMENTIN) 875-125 MG per tablet        COPD exacerbation due to acute bronchitis.  Treat with prednisone and antibiotic.  I called and spoke with the patient briefly.  He was in no respiratory distress.  I needed to confirm pharmacy with the patient    Patient instructed to call the office if worsens, or fails to improve as anticipated.    5-10 minutes were spent on the digital evaluation and management of this patient.

## 2025-02-05 DIAGNOSIS — J44.1 COPD EXACERBATION (HCC): Primary | ICD-10-CM

## 2025-02-05 NOTE — PROGRESS NOTES
[facility]    Patient: DAGO TAPIA, 55 y.o.   : 1970  2025      Patient of Dr. Gonzales, Kia Garcia, MICHELLE     Assessment/Plan   1. COPD exacerbation (HCC)    2. Moderate COPD (chronic obstructive pulmonary disease) (HCC)    3. Acute cough      -Patient has already been treated for COPD exacerbation with multiple rounds of antibiotics including azithromycin, doxycycline, 2 rounds of Augmentin as well as 4 rounds of prednisone over the last 4 months.  -Patient reports improving symptoms today with lingering cough which is moderately productive of clear sputum which is occasionally green  -Patient struck to increase usage of albuterol nebulizer to 2-3 times per day if symptoms continue.  Resume as needed usage once symptoms resolve  -Complete sputum culture to ensure no bacterial infection require additional antibiotics  -Encouraged conservative measures including drinking fluids, warm salt water gargles, humidification  -Patient encouraged to follow-up with ENT if hoarseness of voice persist over the next 1 to 2 months for further evaluation  - Culture, Respiratory -will call with results when available  -Continue fluticasone-umeclidin-vilant (TRELEGY ELLIPTA) 100-62.5-25 MCG/ACT AEPB inhaler; Inhale 1 puff into the lungs daily  Dispense: 28 each; Refill: 3  -Reviewed preventative vaccinations    Advised patient to call office with any changes, questions, or concerns regarding respiratory status or issues with prescribed medications    Return in about 2 months (around 2025) for COPD after testing with kia .     Subjective     Chief Complaint   Patient presents with    Follow-up     7 month COPD follow up, no testing.   Patient requested sooner appointment.   Kia patient.         HPI  Dago is here for follow up for moderate COPD.     Patient was treated for COPD exacerbation with azithromycin and prednisone at his last visit on 2024    Thinks he

## 2025-02-06 ENCOUNTER — HOSPITAL ENCOUNTER (OUTPATIENT)
Age: 55
Discharge: HOME | End: 2025-02-06
Payer: COMMERCIAL

## 2025-02-06 ENCOUNTER — HOSPITAL ENCOUNTER (OUTPATIENT)
Dept: GENERAL RADIOLOGY | Age: 55
Discharge: HOME | End: 2025-02-06
Payer: COMMERCIAL

## 2025-02-06 DIAGNOSIS — J44.1 COPD EXACERBATION (HCC): ICD-10-CM

## 2025-02-06 PROCEDURE — 71046 X-RAY EXAM CHEST 2 VIEWS: CPT

## 2025-02-07 ENCOUNTER — OFFICE VISIT (OUTPATIENT)
Dept: PULMONOLOGY | Age: 55
End: 2025-02-07
Payer: COMMERCIAL

## 2025-02-07 VITALS
HEIGHT: 74 IN | DIASTOLIC BLOOD PRESSURE: 70 MMHG | HEART RATE: 63 BPM | WEIGHT: 283 LBS | SYSTOLIC BLOOD PRESSURE: 114 MMHG | TEMPERATURE: 98.2 F | BODY MASS INDEX: 36.32 KG/M2 | OXYGEN SATURATION: 97 %

## 2025-02-07 DIAGNOSIS — J44.1 COPD EXACERBATION (HCC): Primary | ICD-10-CM

## 2025-02-07 DIAGNOSIS — R05.1 ACUTE COUGH: ICD-10-CM

## 2025-02-07 DIAGNOSIS — J44.9 MODERATE COPD (CHRONIC OBSTRUCTIVE PULMONARY DISEASE) (HCC): ICD-10-CM

## 2025-02-07 PROCEDURE — 99214 OFFICE O/P EST MOD 30 MIN: CPT

## 2025-02-07 RX ORDER — FLUTICASONE FUROATE, UMECLIDINIUM BROMIDE AND VILANTEROL TRIFENATATE 100; 62.5; 25 UG/1; UG/1; UG/1
1 POWDER RESPIRATORY (INHALATION) DAILY
Qty: 28 EACH | Refills: 3 | Status: SHIPPED | OUTPATIENT
Start: 2025-02-07

## 2025-02-11 ASSESSMENT — PATIENT HEALTH QUESTIONNAIRE - PHQ9
SUM OF ALL RESPONSES TO PHQ QUESTIONS 1-9: 0
SUM OF ALL RESPONSES TO PHQ9 QUESTIONS 1 & 2: 0
SUM OF ALL RESPONSES TO PHQ9 QUESTIONS 1 & 2: 0
1. LITTLE INTEREST OR PLEASURE IN DOING THINGS: NOT AT ALL
1. LITTLE INTEREST OR PLEASURE IN DOING THINGS: NOT AT ALL
SUM OF ALL RESPONSES TO PHQ QUESTIONS 1-9: 0
2. FEELING DOWN, DEPRESSED OR HOPELESS: NOT AT ALL
2. FEELING DOWN, DEPRESSED OR HOPELESS: NOT AT ALL
SUM OF ALL RESPONSES TO PHQ QUESTIONS 1-9: 0
SUM OF ALL RESPONSES TO PHQ QUESTIONS 1-9: 0

## 2025-02-14 ENCOUNTER — TELEPHONE (OUTPATIENT)
Dept: FAMILY MEDICINE CLINIC | Age: 55
End: 2025-02-14

## 2025-02-14 ENCOUNTER — OFFICE VISIT (OUTPATIENT)
Dept: FAMILY MEDICINE CLINIC | Age: 55
End: 2025-02-14
Payer: COMMERCIAL

## 2025-02-14 ENCOUNTER — TELEPHONE (OUTPATIENT)
Dept: PULMONOLOGY | Age: 55
End: 2025-02-14

## 2025-02-14 VITALS
DIASTOLIC BLOOD PRESSURE: 84 MMHG | WEIGHT: 284 LBS | RESPIRATION RATE: 18 BRPM | BODY MASS INDEX: 36.45 KG/M2 | SYSTOLIC BLOOD PRESSURE: 128 MMHG | HEIGHT: 74 IN | HEART RATE: 67 BPM | OXYGEN SATURATION: 99 % | TEMPERATURE: 97.6 F

## 2025-02-14 DIAGNOSIS — J44.9 MODERATE COPD (CHRONIC OBSTRUCTIVE PULMONARY DISEASE) (HCC): ICD-10-CM

## 2025-02-14 DIAGNOSIS — E78.49 OTHER HYPERLIPIDEMIA: ICD-10-CM

## 2025-02-14 DIAGNOSIS — Z12.5 PROSTATE CANCER SCREENING: ICD-10-CM

## 2025-02-14 DIAGNOSIS — Z00.00 WELL ADULT EXAM: Primary | ICD-10-CM

## 2025-02-14 PROBLEM — F17.200 TOBACCO DEPENDENCE: Status: RESOLVED | Noted: 2024-03-11 | Resolved: 2025-02-14

## 2025-02-14 PROCEDURE — 99396 PREV VISIT EST AGE 40-64: CPT | Performed by: FAMILY MEDICINE

## 2025-02-14 RX ORDER — FLUTICASONE FUROATE, UMECLIDINIUM BROMIDE AND VILANTEROL TRIFENATATE 100; 62.5; 25 UG/1; UG/1; UG/1
1 POWDER RESPIRATORY (INHALATION) DAILY
Qty: 3 EACH | Refills: 3 | Status: SHIPPED | OUTPATIENT
Start: 2025-02-14

## 2025-02-14 SDOH — ECONOMIC STABILITY: FOOD INSECURITY: WITHIN THE PAST 12 MONTHS, YOU WORRIED THAT YOUR FOOD WOULD RUN OUT BEFORE YOU GOT MONEY TO BUY MORE.: NEVER TRUE

## 2025-02-14 SDOH — ECONOMIC STABILITY: FOOD INSECURITY: WITHIN THE PAST 12 MONTHS, THE FOOD YOU BOUGHT JUST DIDN'T LAST AND YOU DIDN'T HAVE MONEY TO GET MORE.: NEVER TRUE

## 2025-02-14 ASSESSMENT — ENCOUNTER SYMPTOMS
SORE THROAT: 0
WHEEZING: 0
SHORTNESS OF BREATH: 0
VOICE CHANGE: 1
COUGH: 1
ABDOMINAL PAIN: 0
PHOTOPHOBIA: 0
EYE DISCHARGE: 0
NAUSEA: 0

## 2025-02-14 NOTE — TELEPHONE ENCOUNTER
[facility] pharmacy called in stating that Jonathan wrote Trelegy for a dispense quantity of 28 each. I told pharmacy that he probably meant to do 3 each with 3 refills so that it is a year worth of 90 day supplies. Please advise & send in new script with correct dispense quantity, thanks!

## 2025-02-14 NOTE — PROGRESS NOTES
[facility] PROFESSION[facility]  [facility]  [address]  [facility] [address]  Dept: [phone]  Dept Fax: [phone]  Loc: [phone]  PROGRESS NOTE      Visit Date: 2/14/2025    Kirk Evans is a 55 y.o. male who presents today for:  Chief Complaint   Patient presents with    Annual Exam       Chief complaint:  physical    Subjective:  HPI    Well adult exam    Exercise:  nothing  Diet:  nothing particular  Last Dentist appt:  dentures  Last optometry appt: lasy year  Sleep:  good  Mood:  good  Other concerns:       COPD.  Sees pulmonology.  On trelegy. Not smoking.  Full exacerbations of copd in past few months:  does E-vists.  Pulse ox at home with baseline 96-98%    Umbilical hernia.  1 year. No pain. Declines management    Review of Systems   Constitutional:  Negative for fever and unexpected weight change.   HENT:  Positive for voice change. Negative for ear pain and sore throat.    Eyes:  Negative for photophobia and discharge.   Respiratory:  Positive for cough. Negative for shortness of breath and wheezing.    Cardiovascular:  Negative for chest pain and leg swelling.   Gastrointestinal:  Negative for abdominal pain and nausea.   Endocrine: Negative for cold intolerance and heat intolerance.   Genitourinary:  Negative for dysuria and frequency.   Skin:  Negative for pallor and rash.   Neurological:  Negative for syncope and light-headedness.   Hematological:  Negative for adenopathy. Does not bruise/bleed easily.   Psychiatric/Behavioral:  Negative for sleep disturbance. The patient is not nervous/anxious.      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

## 2025-02-17 RX ORDER — ALBUTEROL SULFATE 90 UG/1
INHALANT RESPIRATORY (INHALATION)
Qty: 8.5 G | Refills: 5 | Status: SHIPPED | OUTPATIENT
Start: 2025-02-17

## 2025-02-17 NOTE — TELEPHONE ENCOUNTER
Kirk Evans called requesting a refill on the following medications:  Requested Prescriptions     Pending Prescriptions Disp Refills    albuterol sulfate HFA (PROVENTIL;VENTOLIN;PROAIR) 108 (90 Base) MCG/ACT inhaler [Pharmacy Med Name: Albuterol Sulfate HFA 90mcg/actuation Inhalation Aerosol] 8.5 g 5     Sig: Inhale 2 puffs by mouth and into the lungs every 6 hours as needed for wheezing.       Date of last visit: 2/14/2025  Date of next visit (if applicable):2/20/2026  Date of last refill: 11/30/2023  Pharmacy Name: [facility].       Thanks,  Yoli Villanueva MA

## 2025-02-18 NOTE — TELEPHONE ENCOUNTER
Forms completed and scanned into chart  LVM for patient to return call to notified paperwork is complete and can be picked up.  Forms placed in box at .

## 2025-03-07 ENCOUNTER — HOSPITAL ENCOUNTER (OUTPATIENT)
Dept: CT IMAGING | Age: 55
Discharge: HOME | End: 2025-03-07
Payer: COMMERCIAL

## 2025-03-07 ENCOUNTER — E-VISIT (OUTPATIENT)
Dept: FAMILY MEDICINE CLINIC | Age: 55
End: 2025-03-07

## 2025-03-07 DIAGNOSIS — J44.1 COPD EXACERBATION (HCC): ICD-10-CM

## 2025-03-07 DIAGNOSIS — Z87.891 PERSONAL HISTORY OF TOBACCO USE: ICD-10-CM

## 2025-03-07 DIAGNOSIS — J44.1 COPD EXACERBATION (HCC): Primary | ICD-10-CM

## 2025-03-07 PROCEDURE — 71271 CT THORAX LUNG CANCER SCR C-: CPT

## 2025-03-07 ASSESSMENT — LIFESTYLE VARIABLES
PACKS_PER_DAY: 2
SMOKING_STATUS: NO, BUT I USED TO SMOKE
SMOKING_YEARS: 35

## 2025-03-08 RX ORDER — DOXYCYCLINE HYCLATE 100 MG
100 TABLET ORAL 2 TIMES DAILY
Qty: 20 TABLET | Refills: 0 | Status: SHIPPED | OUTPATIENT
Start: 2025-03-08 | End: 2025-03-18

## 2025-03-08 RX ORDER — PREDNISONE 20 MG/1
20 TABLET ORAL 2 TIMES DAILY
Qty: 10 TABLET | Refills: 0 | Status: SHIPPED | OUTPATIENT
Start: 2025-03-08 | End: 2025-03-13

## 2025-03-08 NOTE — PROGRESS NOTES
HPI: as per patient provided history  Exam: N/A (electronic visit)  ASSESSMENT/PLAN:   Diagnosis Orders   1. COPD exacerbation (HCC)  predniSONE (DELTASONE) 20 MG tablet    doxycycline hyclate (VIBRA-TABS) 100 MG tablet        Copd exacerbation.  Abx and steroid    Patient instructed to call the office if worsens, or fails to improve as anticipated.    5-10 minutes were spent on the digital evaluation and management of this patient.

## 2025-03-12 DIAGNOSIS — J44.1 COPD EXACERBATION (HCC): ICD-10-CM

## 2025-03-13 RX ORDER — PREDNISONE 20 MG/1
TABLET ORAL
Qty: 10 TABLET | Refills: 0 | OUTPATIENT
Start: 2025-03-13

## 2025-03-13 NOTE — TELEPHONE ENCOUNTER
Kirk Evans called requesting a refill on the following medications:  Requested Prescriptions     Pending Prescriptions Disp Refills    predniSONE (DELTASONE) 20 MG tablet [Pharmacy Med Name: predniSONE 20 MG Oral Tablet] 10 tablet 0     Sig: Take 1 tablet by mouth twice daily for 5 days       Date of last visit: 3/7/2025  Date of next visit (if applicable):2/20/2026  Date of last refill: 3/8/2025  Pharmacy Name: [facility]       THis was a short term supply for sickness. If patient not improving he will need an appointment. Sent patient a [employer] message.     Thanks,  Yoli Villanueva MA

## 2025-04-30 ENCOUNTER — PREP FOR PROCEDURE (OUTPATIENT)
Dept: SURGERY | Age: 55
End: 2025-04-30

## 2025-04-30 ENCOUNTER — OFFICE VISIT (OUTPATIENT)
Dept: SURGERY | Age: 55
End: 2025-04-30
Payer: COMMERCIAL

## 2025-04-30 VITALS
RESPIRATION RATE: 18 BRPM | OXYGEN SATURATION: 92 % | TEMPERATURE: 97.1 F | WEIGHT: 282 LBS | SYSTOLIC BLOOD PRESSURE: 124 MMHG | BODY MASS INDEX: 36.19 KG/M2 | HEIGHT: 74 IN | HEART RATE: 100 BPM | DIASTOLIC BLOOD PRESSURE: 82 MMHG

## 2025-04-30 DIAGNOSIS — K42.0 INCARCERATED UMBILICAL HERNIA: ICD-10-CM

## 2025-04-30 DIAGNOSIS — K42.0 INCARCERATED UMBILICAL HERNIA: Primary | ICD-10-CM

## 2025-04-30 PROCEDURE — 99204 OFFICE O/P NEW MOD 45 MIN: CPT | Performed by: SURGERY

## 2025-05-01 ASSESSMENT — ENCOUNTER SYMPTOMS
SORE THROAT: 0
TROUBLE SWALLOWING: 0
FACIAL SWELLING: 0
COLOR CHANGE: 0
WHEEZING: 0
BACK PAIN: 0
COUGH: 0
ABDOMINAL DISTENTION: 0
SINUS PRESSURE: 0
PHOTOPHOBIA: 0
APNEA: 0
EYE ITCHING: 0
ALLERGIC/IMMUNOLOGIC NEGATIVE: 1
CONSTIPATION: 0
CHOKING: 0
EYE DISCHARGE: 0
BLOOD IN STOOL: 0
STRIDOR: 0
ANAL BLEEDING: 0
ABDOMINAL PAIN: 1
RHINORRHEA: 0
DIARRHEA: 0
SHORTNESS OF BREATH: 0
VOMITING: 0
EYE PAIN: 0
EYE REDNESS: 0
NAUSEA: 0
CHEST TIGHTNESS: 0
VOICE CHANGE: 0
RECTAL PAIN: 0

## 2025-05-01 NOTE — PROGRESS NOTES
Kirk Evans (:  1970)     ASSESSMENT:  1.  Incarcerated umbilical hernia    PLAN:  1. Schedule Kirk for repair incarcerated umbilical hernia with mesh.  2. He will undergo pre-operative clearance per anesthesia guidelines with risk factors listed under the past medical history diagnosis & problem list.  3. The risks, benefits and alternatives were discussed with Kirk including non-operative management.  Robotic, laparoscopic and open techniques discussed.  Pros and cons of mesh insertion discussed.  All questions answered. He understands and wishes to proceed with surgical intervention.  4. Restrictions discussed with Kirk and he expresses understanding.  5. He is advised to call back directly if there are further questions/concerns, or if his symptoms worsen prior to surgery.    SUBJECTIVE/OBJECTIVE:    Chief Complaint   Patient presents with    Surgical Consult     NP ref by Dr. Tineo - Umbilical hernia     HPI  Kirk is a 55-year-old male who presents for initial evaluation secondary to an incarcerated umbilical hernia.  He states he has had it for more than a year.  Has gradually increased in size.  Started to get a lot more pain and discomfort over the past month.  Worse with increased activity, lifting and bending over.  Worsens throughout the day when he is more active.  Improves with rest.  No generalized abdominal pain.  No nausea or vomiting.  Tolerating a diet.  Normal bowel function.  No hematochezia or melena.  No new urinary complaints.  History of appendectomy but otherwise no significant major abdominal surgeries in the past.    Review of Systems   Constitutional:  Negative for activity change, appetite change, chills, diaphoresis, fatigue, fever and unexpected weight change.   HENT:  Negative for congestion, dental problem, drooling, ear discharge, ear pain, facial swelling, hearing loss, mouth sores, nosebleeds, postnasal drip, rhinorrhea, sinus pressure, sneezing, sore throat,

## 2025-05-02 ENCOUNTER — E-VISIT (OUTPATIENT)
Dept: FAMILY MEDICINE CLINIC | Age: 55
End: 2025-05-02

## 2025-05-02 DIAGNOSIS — J44.1 COPD EXACERBATION (HCC): ICD-10-CM

## 2025-05-02 RX ORDER — DOXYCYCLINE HYCLATE 100 MG
100 TABLET ORAL 2 TIMES DAILY
Qty: 20 TABLET | Refills: 0 | Status: SHIPPED | OUTPATIENT
Start: 2025-05-02 | End: 2025-05-12

## 2025-05-02 RX ORDER — PREDNISONE 20 MG/1
20 TABLET ORAL 2 TIMES DAILY
Qty: 10 TABLET | Refills: 0 | Status: SHIPPED | OUTPATIENT
Start: 2025-05-02 | End: 2025-05-07

## 2025-05-02 ASSESSMENT — LIFESTYLE VARIABLES
PACKS_PER_DAY: 2
SMOKING_STATUS: NO, BUT I USED TO SMOKE
SMOKING_YEARS: 35

## 2025-05-02 NOTE — PROGRESS NOTES
HPI: as per patient provided history  Exam: N/A (electronic visit)  ASSESSMENT/PLAN:   Diagnosis Orders   1. COPD exacerbation (HCC)  doxycycline hyclate (VIBRA-TABS) 100 MG tablet    predniSONE (DELTASONE) 20 MG tablet        COPD exacerbation.  Treat with antibiotic and steroid    Patient instructed to call the office if worsens, or fails to improve as anticipated.     5 minutes were spent on the digital evaluation and management of this patient.

## 2025-05-16 ENCOUNTER — E-VISIT (OUTPATIENT)
Dept: FAMILY MEDICINE CLINIC | Age: 55
End: 2025-05-16
Payer: COMMERCIAL

## 2025-05-16 DIAGNOSIS — J44.1 COPD EXACERBATION (HCC): Primary | ICD-10-CM

## 2025-05-16 PROCEDURE — 99421 OL DIG E/M SVC 5-10 MIN: CPT | Performed by: FAMILY MEDICINE

## 2025-05-16 RX ORDER — PREDNISONE 20 MG/1
20 TABLET ORAL 2 TIMES DAILY
Qty: 10 TABLET | Refills: 0 | Status: SHIPPED | OUTPATIENT
Start: 2025-05-16 | End: 2025-05-21

## 2025-05-16 RX ORDER — AZITHROMYCIN 250 MG/1
TABLET, FILM COATED ORAL
Qty: 6 TABLET | Refills: 0 | Status: SHIPPED | OUTPATIENT
Start: 2025-05-16 | End: 2025-05-26

## 2025-05-16 ASSESSMENT — LIFESTYLE VARIABLES
SMOKING_YEARS: 35
PACKS_PER_DAY: 2
SMOKING_STATUS: NO, BUT I USED TO SMOKE

## 2025-05-16 NOTE — PROGRESS NOTES
HPI: as per patient provided history  Exam: N/A (electronic visit)  ASSESSMENT/PLAN:  1. COPD exacerbation (HCC)  COPD exacerbation likely due to viral illness, possibly influenza. Too late to treat for influenza.  Will treat with antibiotic and steroid  - predniSONE (DELTASONE) 20 MG tablet; Take 1 tablet by mouth 2 times daily for 5 days  Dispense: 10 tablet; Refill: 0  - azithromycin (ZITHROMAX) 250 MG tablet; 500mg on day 1 followed by 250mg on days 2 - 5  Dispense: 6 tablet; Refill: 0        Patient instructed to call the office if worsens, or fails to improve as anticipated.     7 minutes were spent on the digital evaluation and management of this patient.

## 2025-05-18 DIAGNOSIS — J44.9 MODERATE COPD (CHRONIC OBSTRUCTIVE PULMONARY DISEASE) (HCC): ICD-10-CM

## 2025-05-19 RX ORDER — ALBUTEROL SULFATE 0.83 MG/ML
SOLUTION RESPIRATORY (INHALATION)
Qty: 360 ML | Refills: 2 | Status: SHIPPED | OUTPATIENT
Start: 2025-05-19

## 2025-05-19 NOTE — TELEPHONE ENCOUNTER
Kirk Evans called requesting a refill on the following medications:  Requested Prescriptions     Pending Prescriptions Disp Refills    albuterol (PROVENTIL) (2.5 MG/3ML) 0.083% nebulizer solution [Pharmacy Med Name: Albuterol Sulfate 0.083% Solution for Inhalation] 360 mL 0     Sig: Inhale the contents of 1 vial via nebulizer every 6 hours as needed for wheezing.       Date of last visit: 5/16/2025  Date of next visit (if applicable):2/20/2026  Date of last refill: 09/25/2024  Pharmacy Name: "[facility]      NICK Diehl JENNIFER, LPN

## 2025-05-22 ENCOUNTER — E-VISIT (OUTPATIENT)
Dept: FAMILY MEDICINE CLINIC | Age: 55
End: 2025-05-22

## 2025-05-22 DIAGNOSIS — J44.1 COPD EXACERBATION (HCC): Primary | ICD-10-CM

## 2025-05-22 PROCEDURE — 99999 PR OFFICE/OUTPT VISIT,PROCEDURE ONLY: CPT | Performed by: FAMILY MEDICINE

## 2025-05-22 RX ORDER — PREDNISONE 20 MG/1
20 TABLET ORAL 2 TIMES DAILY
Qty: 14 TABLET | Refills: 0 | Status: SHIPPED | OUTPATIENT
Start: 2025-05-22 | End: 2025-05-29

## 2025-05-22 ASSESSMENT — LIFESTYLE VARIABLES
SMOKING_STATUS: NO, BUT I USED TO SMOKE
PACKS_PER_DAY: 2
SMOKING_YEARS: 35

## 2025-05-22 NOTE — PROGRESS NOTES
He had an e-visit performed on 5/16.  Unable to bill for additional e-visit.    Please call patient and assess symptoms.  Is he looking for more prednisone or an additional antibiotic?  Confirm pharmacy please    Please advise patient.  Benja Tineo MD

## 2025-05-22 NOTE — PROGRESS NOTES
Called patient he stated his voice is still horse and a lot of coughing. He has improved but not much. He stated his oxygen level is fine but does get winded on exertion. Extremely productive cough. He is concern about it turning worse. If Dr. Tineo wants to send something else in to please send to [facility] in [place].

## 2025-06-04 ENCOUNTER — PREP FOR PROCEDURE (OUTPATIENT)
Dept: SURGERY | Age: 55
End: 2025-06-04

## 2025-06-04 RX ORDER — SODIUM CHLORIDE 9 MG/ML
INJECTION, SOLUTION INTRAVENOUS CONTINUOUS
OUTPATIENT
Start: 2025-06-04

## 2025-06-04 RX ORDER — SODIUM CHLORIDE 9 MG/ML
INJECTION, SOLUTION INTRAVENOUS PRN
OUTPATIENT
Start: 2025-06-04

## 2025-06-04 RX ORDER — SODIUM CHLORIDE 0.9 % (FLUSH) 0.9 %
5-40 SYRINGE (ML) INJECTION PRN
OUTPATIENT
Start: 2025-06-04

## 2025-06-04 RX ORDER — SODIUM CHLORIDE 0.9 % (FLUSH) 0.9 %
5-40 SYRINGE (ML) INJECTION EVERY 12 HOURS SCHEDULED
OUTPATIENT
Start: 2025-06-04

## 2025-06-09 ENCOUNTER — TELEPHONE (OUTPATIENT)
Dept: SURGERY | Age: 55
End: 2025-06-09

## 2025-06-09 NOTE — TELEPHONE ENCOUNTER
Pt called in to r/s his surgery on 6/13 needing to move it into August. Surgery r/s;d to 8/14, arrive at 6 am. Pt voiced understanding

## 2025-06-23 ENCOUNTER — E-VISIT (OUTPATIENT)
Dept: FAMILY MEDICINE CLINIC | Age: 55
End: 2025-06-23
Payer: COMMERCIAL

## 2025-06-23 DIAGNOSIS — J44.1 COPD EXACERBATION (HCC): Primary | ICD-10-CM

## 2025-06-23 PROCEDURE — 99421 OL DIG E/M SVC 5-10 MIN: CPT | Performed by: FAMILY MEDICINE

## 2025-06-23 RX ORDER — DOXYCYCLINE HYCLATE 100 MG
100 TABLET ORAL 2 TIMES DAILY
Qty: 20 TABLET | Refills: 0 | Status: SHIPPED | OUTPATIENT
Start: 2025-06-23 | End: 2025-07-03

## 2025-06-23 RX ORDER — PREDNISONE 20 MG/1
20 TABLET ORAL 2 TIMES DAILY
Qty: 10 TABLET | Refills: 0 | Status: SHIPPED | OUTPATIENT
Start: 2025-06-23 | End: 2025-06-28

## 2025-06-23 ASSESSMENT — LIFESTYLE VARIABLES
SMOKING_STATUS: NO, BUT I USED TO SMOKE
PACKS_PER_DAY: 2
SMOKING_YEARS: 36

## 2025-06-23 NOTE — PROGRESS NOTES
HPI: as per patient provided history  Exam: N/A (electronic visit)  ASSESSMENT/PLAN:   Diagnosis Orders   1. COPD exacerbation (HCC)  predniSONE (DELTASONE) 20 MG tablet    doxycycline hyclate (VIBRA-TABS) 100 MG tablet        New problem COPD exacerbation.  Treat with prednisone and doxycycline    Patient instructed to call the office if worsens, or fails to improve as anticipated.     5 minutes were spent on the digital evaluation and management of this patient.

## 2025-07-17 ENCOUNTER — E-VISIT (OUTPATIENT)
Dept: FAMILY MEDICINE CLINIC | Age: 55
End: 2025-07-17
Payer: COMMERCIAL

## 2025-07-17 DIAGNOSIS — J44.1 COPD EXACERBATION (HCC): Primary | ICD-10-CM

## 2025-07-17 PROCEDURE — 99421 OL DIG E/M SVC 5-10 MIN: CPT | Performed by: FAMILY MEDICINE

## 2025-07-17 RX ORDER — PREDNISONE 20 MG/1
20 TABLET ORAL 2 TIMES DAILY
Qty: 10 TABLET | Refills: 0 | Status: SHIPPED | OUTPATIENT
Start: 2025-07-17 | End: 2025-07-22

## 2025-07-17 RX ORDER — AZITHROMYCIN 250 MG/1
TABLET, FILM COATED ORAL
Qty: 6 TABLET | Refills: 0 | Status: SHIPPED | OUTPATIENT
Start: 2025-07-17 | End: 2025-07-27

## 2025-07-17 ASSESSMENT — LIFESTYLE VARIABLES
SMOKING_YEARS: 35
PACKS_PER_DAY: 2
SMOKING_STATUS: NO, BUT I USED TO SMOKE

## 2025-07-18 NOTE — PROGRESS NOTES
HPI: as per patient provided history  Exam: N/A (electronic visit)  ASSESSMENT/PLAN:   Diagnosis Orders   1. COPD exacerbation (HCC)  predniSONE (DELTASONE) 20 MG tablet    azithromycin (ZITHROMAX) 250 MG tablet        COPD exacerbation.  Treat with prednisone and azithromycin    Patient instructed to call the office if worsens, or fails to improve as anticipated.     6 minutes were spent on the digital evaluation and management of this patient.

## 2025-08-14 ENCOUNTER — ANESTHESIA EVENT (OUTPATIENT)
Dept: OPERATING ROOM | Age: 55
End: 2025-08-14
Payer: COMMERCIAL

## 2025-08-14 ENCOUNTER — ANESTHESIA (OUTPATIENT)
Dept: OPERATING ROOM | Age: 55
End: 2025-08-14
Payer: COMMERCIAL

## 2025-08-14 ENCOUNTER — HOSPITAL ENCOUNTER (OUTPATIENT)
Age: 55
Setting detail: OUTPATIENT SURGERY
Discharge: HOME | End: 2025-08-14
Attending: SURGERY | Admitting: SURGERY
Payer: COMMERCIAL

## 2025-08-14 VITALS
DIASTOLIC BLOOD PRESSURE: 77 MMHG | HEIGHT: 74 IN | RESPIRATION RATE: 14 BRPM | BODY MASS INDEX: 34.78 KG/M2 | SYSTOLIC BLOOD PRESSURE: 137 MMHG | HEART RATE: 72 BPM | OXYGEN SATURATION: 97 % | WEIGHT: 271 LBS | TEMPERATURE: 97.8 F

## 2025-08-14 DIAGNOSIS — Z09 S/P UMBILICAL HERNIA REPAIR, FOLLOW-UP EXAM: Primary | ICD-10-CM

## 2025-08-14 PROCEDURE — 3700000001 HC ADD 15 MINUTES (ANESTHESIA): Performed by: SURGERY

## 2025-08-14 PROCEDURE — 7100000001 HC PACU RECOVERY - ADDTL 15 MIN: Performed by: SURGERY

## 2025-08-14 PROCEDURE — 6360000002 HC RX W HCPCS: Performed by: NURSE PRACTITIONER

## 2025-08-14 PROCEDURE — 2580000003 HC RX 258

## 2025-08-14 PROCEDURE — 3700000000 HC ANESTHESIA ATTENDED CARE: Performed by: SURGERY

## 2025-08-14 PROCEDURE — 6360000002 HC RX W HCPCS

## 2025-08-14 PROCEDURE — 2709999900 HC NON-CHARGEABLE SUPPLY: Performed by: SURGERY

## 2025-08-14 PROCEDURE — 2500000003 HC RX 250 WO HCPCS

## 2025-08-14 PROCEDURE — 7100000011 HC PHASE II RECOVERY - ADDTL 15 MIN: Performed by: SURGERY

## 2025-08-14 PROCEDURE — 3600000002 HC SURGERY LEVEL 2 BASE: Performed by: SURGERY

## 2025-08-14 PROCEDURE — 3600000012 HC SURGERY LEVEL 2 ADDTL 15MIN: Performed by: SURGERY

## 2025-08-14 PROCEDURE — 7100000010 HC PHASE II RECOVERY - FIRST 15 MIN: Performed by: SURGERY

## 2025-08-14 PROCEDURE — 6360000002 HC RX W HCPCS: Performed by: SURGERY

## 2025-08-14 PROCEDURE — 7100000000 HC PACU RECOVERY - FIRST 15 MIN: Performed by: SURGERY

## 2025-08-14 PROCEDURE — 2580000003 HC RX 258: Performed by: NURSE PRACTITIONER

## 2025-08-14 PROCEDURE — 49594 RPR AA HRN 1ST 3-10 NCR/STRN: CPT | Performed by: SURGERY

## 2025-08-14 PROCEDURE — 2780000010 HC IMPLANT OTHER: Performed by: SURGERY

## 2025-08-14 RX ORDER — DEXAMETHASONE SODIUM PHOSPHATE 10 MG/ML
INJECTION, EMULSION INTRAMUSCULAR; INTRAVENOUS
Status: DISCONTINUED | OUTPATIENT
Start: 2025-08-14 | End: 2025-08-14 | Stop reason: SDUPTHER

## 2025-08-14 RX ORDER — SODIUM CHLORIDE 9 MG/ML
INJECTION, SOLUTION INTRAVENOUS PRN
Status: DISCONTINUED | OUTPATIENT
Start: 2025-08-14 | End: 2025-08-14 | Stop reason: HOSPADM

## 2025-08-14 RX ORDER — HYDROCODONE BITARTRATE AND ACETAMINOPHEN 5; 325 MG/1; MG/1
1 TABLET ORAL EVERY 6 HOURS PRN
Qty: 28 TABLET | Refills: 0 | Status: SHIPPED | OUTPATIENT
Start: 2025-08-14 | End: 2025-08-21

## 2025-08-14 RX ORDER — BUPIVACAINE HYDROCHLORIDE 5 MG/ML
INJECTION, SOLUTION EPIDURAL; INTRACAUDAL; PERINEURAL PRN
Status: DISCONTINUED | OUTPATIENT
Start: 2025-08-14 | End: 2025-08-14 | Stop reason: ALTCHOICE

## 2025-08-14 RX ORDER — SODIUM CHLORIDE 0.9 % (FLUSH) 0.9 %
5-40 SYRINGE (ML) INJECTION PRN
Status: DISCONTINUED | OUTPATIENT
Start: 2025-08-14 | End: 2025-08-14 | Stop reason: HOSPADM

## 2025-08-14 RX ORDER — PROPOFOL 10 MG/ML
INJECTION, EMULSION INTRAVENOUS
Status: DISCONTINUED | OUTPATIENT
Start: 2025-08-14 | End: 2025-08-14 | Stop reason: SDUPTHER

## 2025-08-14 RX ORDER — HYDROCODONE BITARTRATE AND ACETAMINOPHEN 5; 325 MG/1; MG/1
2 TABLET ORAL EVERY 4 HOURS PRN
Status: DISCONTINUED | OUTPATIENT
Start: 2025-08-14 | End: 2025-08-14 | Stop reason: HOSPADM

## 2025-08-14 RX ORDER — HYDROCODONE BITARTRATE AND ACETAMINOPHEN 5; 325 MG/1; MG/1
1 TABLET ORAL EVERY 4 HOURS PRN
Status: DISCONTINUED | OUTPATIENT
Start: 2025-08-14 | End: 2025-08-14 | Stop reason: HOSPADM

## 2025-08-14 RX ORDER — SODIUM CHLORIDE 0.9 % (FLUSH) 0.9 %
5-40 SYRINGE (ML) INJECTION EVERY 12 HOURS SCHEDULED
Status: DISCONTINUED | OUTPATIENT
Start: 2025-08-14 | End: 2025-08-14 | Stop reason: HOSPADM

## 2025-08-14 RX ORDER — MORPHINE SULFATE 2 MG/ML
2 INJECTION, SOLUTION INTRAMUSCULAR; INTRAVENOUS
Status: DISCONTINUED | OUTPATIENT
Start: 2025-08-14 | End: 2025-08-14 | Stop reason: HOSPADM

## 2025-08-14 RX ORDER — ONDANSETRON 4 MG/1
4 TABLET, ORALLY DISINTEGRATING ORAL EVERY 8 HOURS PRN
Status: DISCONTINUED | OUTPATIENT
Start: 2025-08-14 | End: 2025-08-14 | Stop reason: HOSPADM

## 2025-08-14 RX ORDER — KETOROLAC TROMETHAMINE 10 MG/1
10 TABLET, FILM COATED ORAL EVERY 6 HOURS PRN
Qty: 20 TABLET | Refills: 0 | Status: SHIPPED | OUTPATIENT
Start: 2025-08-14 | End: 2025-09-03

## 2025-08-14 RX ORDER — LIDOCAINE HYDROCHLORIDE 20 MG/ML
INJECTION, SOLUTION INTRAVENOUS
Status: DISCONTINUED | OUTPATIENT
Start: 2025-08-14 | End: 2025-08-14 | Stop reason: SDUPTHER

## 2025-08-14 RX ORDER — SODIUM CHLORIDE 9 MG/ML
INJECTION, SOLUTION INTRAVENOUS CONTINUOUS
Status: DISCONTINUED | OUTPATIENT
Start: 2025-08-14 | End: 2025-08-14 | Stop reason: HOSPADM

## 2025-08-14 RX ORDER — MIDAZOLAM HYDROCHLORIDE 1 MG/ML
INJECTION, SOLUTION INTRAMUSCULAR; INTRAVENOUS
Status: DISCONTINUED | OUTPATIENT
Start: 2025-08-14 | End: 2025-08-14 | Stop reason: SDUPTHER

## 2025-08-14 RX ORDER — MORPHINE SULFATE 4 MG/ML
4 INJECTION, SOLUTION INTRAMUSCULAR; INTRAVENOUS
Status: DISCONTINUED | OUTPATIENT
Start: 2025-08-14 | End: 2025-08-14 | Stop reason: HOSPADM

## 2025-08-14 RX ORDER — KETOROLAC TROMETHAMINE 30 MG/ML
INJECTION, SOLUTION INTRAMUSCULAR; INTRAVENOUS
Status: DISCONTINUED | OUTPATIENT
Start: 2025-08-14 | End: 2025-08-14 | Stop reason: SDUPTHER

## 2025-08-14 RX ORDER — SODIUM CHLORIDE, SODIUM LACTATE, POTASSIUM CHLORIDE, CALCIUM CHLORIDE 600; 310; 30; 20 MG/100ML; MG/100ML; MG/100ML; MG/100ML
INJECTION, SOLUTION INTRAVENOUS
Status: DISCONTINUED | OUTPATIENT
Start: 2025-08-14 | End: 2025-08-14 | Stop reason: SDUPTHER

## 2025-08-14 RX ORDER — ONDANSETRON 2 MG/ML
INJECTION INTRAMUSCULAR; INTRAVENOUS
Status: DISCONTINUED | OUTPATIENT
Start: 2025-08-14 | End: 2025-08-14 | Stop reason: SDUPTHER

## 2025-08-14 RX ORDER — FENTANYL CITRATE 50 UG/ML
INJECTION, SOLUTION INTRAMUSCULAR; INTRAVENOUS
Status: DISCONTINUED | OUTPATIENT
Start: 2025-08-14 | End: 2025-08-14 | Stop reason: SDUPTHER

## 2025-08-14 RX ORDER — ROCURONIUM BROMIDE 10 MG/ML
INJECTION, SOLUTION INTRAVENOUS
Status: DISCONTINUED | OUTPATIENT
Start: 2025-08-14 | End: 2025-08-14 | Stop reason: SDUPTHER

## 2025-08-14 RX ORDER — ONDANSETRON 2 MG/ML
4 INJECTION INTRAMUSCULAR; INTRAVENOUS EVERY 6 HOURS PRN
Status: DISCONTINUED | OUTPATIENT
Start: 2025-08-14 | End: 2025-08-14 | Stop reason: HOSPADM

## 2025-08-14 RX ADMIN — DEXAMETHASONE SODIUM PHOSPHATE 4 MG: 10 INJECTION, EMULSION INTRAMUSCULAR; INTRAVENOUS at 07:45

## 2025-08-14 RX ADMIN — Medication 3000 MG: at 07:44

## 2025-08-14 RX ADMIN — FENTANYL CITRATE 50 MCG: 50 INJECTION, SOLUTION INTRAMUSCULAR; INTRAVENOUS at 07:56

## 2025-08-14 RX ADMIN — PROPOFOL 200 MG: 10 INJECTION, EMULSION INTRAVENOUS at 07:37

## 2025-08-14 RX ADMIN — SODIUM CHLORIDE: 0.9 INJECTION, SOLUTION INTRAVENOUS at 06:39

## 2025-08-14 RX ADMIN — SODIUM CHLORIDE, POTASSIUM CHLORIDE, SODIUM LACTATE AND CALCIUM CHLORIDE: 600; 310; 30; 20 INJECTION, SOLUTION INTRAVENOUS at 08:10

## 2025-08-14 RX ADMIN — Medication 100 MG: at 07:37

## 2025-08-14 RX ADMIN — ROCURONIUM BROMIDE 50 MG: 10 INJECTION, SOLUTION INTRAVENOUS at 07:37

## 2025-08-14 RX ADMIN — MIDAZOLAM 2 MG: 1 INJECTION INTRAMUSCULAR; INTRAVENOUS at 07:33

## 2025-08-14 RX ADMIN — FENTANYL CITRATE 50 MCG: 50 INJECTION, SOLUTION INTRAMUSCULAR; INTRAVENOUS at 07:49

## 2025-08-14 RX ADMIN — FENTANYL CITRATE 50 MCG: 50 INJECTION, SOLUTION INTRAMUSCULAR; INTRAVENOUS at 07:37

## 2025-08-14 RX ADMIN — ONDANSETRON 4 MG: 2 INJECTION, SOLUTION INTRAMUSCULAR; INTRAVENOUS at 07:47

## 2025-08-14 RX ADMIN — KETOROLAC TROMETHAMINE 30 MG: 30 INJECTION, SOLUTION INTRAMUSCULAR at 08:19

## 2025-08-14 ASSESSMENT — COPD QUESTIONNAIRES: CAT_SEVERITY: MODERATE

## 2025-08-14 ASSESSMENT — PAIN DESCRIPTION - DESCRIPTORS
DESCRIPTORS: DISCOMFORT
DESCRIPTORS: ACHING

## 2025-08-14 ASSESSMENT — PAIN SCALES - GENERAL
PAINLEVEL_OUTOF10: 2
PAINLEVEL_OUTOF10: 0

## 2025-08-14 ASSESSMENT — PAIN - FUNCTIONAL ASSESSMENT
PAIN_FUNCTIONAL_ASSESSMENT: 0-10
PAIN_FUNCTIONAL_ASSESSMENT: ACTIVITIES ARE NOT PREVENTED
PAIN_FUNCTIONAL_ASSESSMENT: 0-10
PAIN_FUNCTIONAL_ASSESSMENT: ACTIVITIES ARE NOT PREVENTED

## 2025-08-14 ASSESSMENT — PAIN SCALES - WONG BAKER
WONGBAKER_NUMERICALRESPONSE: NO HURT
WONGBAKER_NUMERICALRESPONSE: NO HURT

## 2025-08-14 ASSESSMENT — LIFESTYLE VARIABLES: SMOKING_STATUS: 1

## 2025-08-15 ENCOUNTER — TELEPHONE (OUTPATIENT)
Dept: SURGERY | Age: 55
End: 2025-08-15

## 2025-08-18 ENCOUNTER — E-VISIT (OUTPATIENT)
Dept: FAMILY MEDICINE CLINIC | Age: 55
End: 2025-08-18
Payer: COMMERCIAL

## 2025-08-18 DIAGNOSIS — J44.1 COPD EXACERBATION (HCC): Primary | ICD-10-CM

## 2025-08-18 PROCEDURE — 99421 OL DIG E/M SVC 5-10 MIN: CPT | Performed by: FAMILY MEDICINE

## 2025-08-18 RX ORDER — PREDNISONE 20 MG/1
20 TABLET ORAL 2 TIMES DAILY
Qty: 10 TABLET | Refills: 0 | Status: SHIPPED | OUTPATIENT
Start: 2025-08-18 | End: 2025-08-23

## 2025-08-18 RX ORDER — DOXYCYCLINE HYCLATE 100 MG
100 TABLET ORAL 2 TIMES DAILY
Qty: 20 TABLET | Refills: 0 | Status: SHIPPED | OUTPATIENT
Start: 2025-08-18 | End: 2025-08-28

## 2025-08-18 ASSESSMENT — LIFESTYLE VARIABLES
PACKS_PER_DAY: 2
SMOKING_STATUS: NO, BUT I USED TO SMOKE
SMOKING_YEARS: 35

## 2025-09-03 ENCOUNTER — OFFICE VISIT (OUTPATIENT)
Dept: SURGERY | Age: 55
End: 2025-09-03
Payer: COMMERCIAL

## 2025-09-03 VITALS
HEART RATE: 93 BPM | BODY MASS INDEX: 34.78 KG/M2 | OXYGEN SATURATION: 93 % | DIASTOLIC BLOOD PRESSURE: 80 MMHG | HEIGHT: 74 IN | TEMPERATURE: 97.4 F | RESPIRATION RATE: 16 BRPM | SYSTOLIC BLOOD PRESSURE: 110 MMHG | WEIGHT: 271 LBS

## 2025-09-03 DIAGNOSIS — Z09 S/P UMBILICAL HERNIA REPAIR, FOLLOW-UP EXAM: Primary | ICD-10-CM

## 2025-09-03 PROCEDURE — 99212 OFFICE O/P EST SF 10 MIN: CPT | Performed by: NURSE PRACTITIONER

## 2025-09-04 ASSESSMENT — ENCOUNTER SYMPTOMS
ANAL BLEEDING: 0
PHOTOPHOBIA: 0
DIARRHEA: 0
CHOKING: 0
NAUSEA: 0
EYE PAIN: 0
ABDOMINAL PAIN: 0
COLOR CHANGE: 0
SORE THROAT: 0
SINUS PRESSURE: 0
COUGH: 0
EYE DISCHARGE: 0
ABDOMINAL DISTENTION: 0
BACK PAIN: 0
CONSTIPATION: 0
CHEST TIGHTNESS: 0
SHORTNESS OF BREATH: 0
APNEA: 0
RHINORRHEA: 0
BLOOD IN STOOL: 0
EYE ITCHING: 0
ALLERGIC/IMMUNOLOGIC NEGATIVE: 1
WHEEZING: 0

## (undated) DEVICE — GLOVE ORANGE PI 8   MSG9080

## (undated) DEVICE — SUTURE VICRYL SZ 3-0 L18IN ABSRB VLT L26MM SH 1/2 CIR J774D

## (undated) DEVICE — Device

## (undated) DEVICE — PENCIL SMK EVAC ALL IN 1 DSGN ENH VISIBILITY IMPROVED AIR

## (undated) DEVICE — SUTURE VICRYL + SZ 4-0 L27IN ABSRB WHT FS-2 3/8 CIR REV CUT VCP422H

## (undated) DEVICE — ADHESIVE SKIN CLOSURE TOP 0.8 CC PREM PUR LIQUIBAND RAPID LF

## (undated) DEVICE — BINDER ABD 2XL H12XL60 75IN UNISX STD E 4 PNL DISPOSABLE

## (undated) DEVICE — GLOVE ORANGE PI 7   MSG9070